# Patient Record
Sex: MALE | Race: BLACK OR AFRICAN AMERICAN | Employment: OTHER | URBAN - METROPOLITAN AREA
[De-identification: names, ages, dates, MRNs, and addresses within clinical notes are randomized per-mention and may not be internally consistent; named-entity substitution may affect disease eponyms.]

---

## 2022-09-20 ENCOUNTER — HOSPITAL ENCOUNTER (EMERGENCY)
Age: 57
Discharge: HOME | End: 2022-09-20
Attending: EMERGENCY MEDICINE
Payer: MEDICARE

## 2022-09-20 ENCOUNTER — APPOINTMENT (OUTPATIENT)
Dept: GENERAL RADIOLOGY | Age: 57
End: 2022-09-20
Payer: MEDICARE

## 2022-09-20 VITALS
DIASTOLIC BLOOD PRESSURE: 123 MMHG | HEIGHT: 72 IN | TEMPERATURE: 99.1 F | WEIGHT: 315 LBS | RESPIRATION RATE: 14 BRPM | BODY MASS INDEX: 42.66 KG/M2 | OXYGEN SATURATION: 94 % | SYSTOLIC BLOOD PRESSURE: 204 MMHG | HEART RATE: 83 BPM

## 2022-09-20 DIAGNOSIS — I10 HYPERTENSION, UNSPECIFIED TYPE: ICD-10-CM

## 2022-09-20 DIAGNOSIS — F32.A DEPRESSION, UNSPECIFIED DEPRESSION TYPE: Primary | ICD-10-CM

## 2022-09-20 LAB
ABSOLUTE EOS #: 0.23 K/UL (ref 0–0.44)
ABSOLUTE IMMATURE GRANULOCYTE: <0.03 K/UL (ref 0–0.3)
ABSOLUTE LYMPH #: 2.32 K/UL (ref 1.1–3.7)
ABSOLUTE MONO #: 0.59 K/UL (ref 0.1–1.2)
ACETAMINOPHEN LEVEL: <5 UG/ML (ref 10–30)
ALBUMIN SERPL-MCNC: 4.4 G/DL (ref 3.5–5.2)
ALBUMIN/GLOBULIN RATIO: 1.3 (ref 1–2.5)
ALP BLD-CCNC: 112 U/L (ref 40–129)
ALT SERPL-CCNC: 21 U/L (ref 5–41)
AMPHETAMINE SCREEN URINE: NEGATIVE
ANION GAP SERPL CALCULATED.3IONS-SCNC: 10 MMOL/L (ref 9–17)
AST SERPL-CCNC: 26 U/L
BARBITURATE SCREEN URINE: NEGATIVE
BASOPHILS # BLD: 1 % (ref 0–2)
BASOPHILS ABSOLUTE: 0.04 K/UL (ref 0–0.2)
BENZODIAZEPINE SCREEN, URINE: NEGATIVE
BILIRUB SERPL-MCNC: 0.6 MG/DL (ref 0.3–1.2)
BILIRUBIN URINE: ABNORMAL
BUN BLDV-MCNC: 8 MG/DL (ref 6–20)
CALCIUM SERPL-MCNC: 9.2 MG/DL (ref 8.6–10.4)
CANNABINOID SCREEN URINE: POSITIVE
CASTS UA: ABNORMAL /LPF (ref 0–8)
CHLORIDE BLD-SCNC: 103 MMOL/L (ref 98–107)
CO2: 26 MMOL/L (ref 20–31)
COCAINE METABOLITE, URINE: NEGATIVE
COLOR: ABNORMAL
CREAT SERPL-MCNC: 0.92 MG/DL (ref 0.7–1.2)
EOSINOPHILS RELATIVE PERCENT: 4 % (ref 1–4)
EPITHELIAL CELLS UA: ABNORMAL /HPF (ref 0–5)
ETHANOL PERCENT: <0.01 %
ETHANOL: <10 MG/DL
FENTANYL URINE: NEGATIVE
GFR AFRICAN AMERICAN: >60 ML/MIN
GFR NON-AFRICAN AMERICAN: >60 ML/MIN
GFR SERPL CREATININE-BSD FRML MDRD: ABNORMAL ML/MIN/{1.73_M2}
GLUCOSE BLD-MCNC: 87 MG/DL (ref 70–99)
GLUCOSE URINE: NEGATIVE
HCT VFR BLD CALC: 48.6 % (ref 40.7–50.3)
HEMOGLOBIN: 16.4 G/DL (ref 13–17)
IMMATURE GRANULOCYTES: 0 %
KETONES, URINE: ABNORMAL
LEUKOCYTE ESTERASE, URINE: ABNORMAL
LYMPHOCYTES # BLD: 36 % (ref 24–43)
MCH RBC QN AUTO: 30.3 PG (ref 25.2–33.5)
MCHC RBC AUTO-ENTMCNC: 33.7 G/DL (ref 28.4–34.8)
MCV RBC AUTO: 89.7 FL (ref 82.6–102.9)
METHADONE SCREEN, URINE: NEGATIVE
MONOCYTES # BLD: 9 % (ref 3–12)
NITRITE, URINE: NEGATIVE
NRBC AUTOMATED: 0 PER 100 WBC
OPIATES, URINE: NEGATIVE
OXYCODONE SCREEN URINE: NEGATIVE
PDW BLD-RTO: 13.9 % (ref 11.8–14.4)
PH UA: 5.5 (ref 5–8)
PHENCYCLIDINE, URINE: NEGATIVE
PLATELET # BLD: 166 K/UL (ref 138–453)
PMV BLD AUTO: 11.3 FL (ref 8.1–13.5)
POTASSIUM SERPL-SCNC: 3.6 MMOL/L (ref 3.7–5.3)
PROTEIN UA: ABNORMAL
RBC # BLD: 5.42 M/UL (ref 4.21–5.77)
RBC UA: ABNORMAL /HPF (ref 0–4)
SALICYLATE LEVEL: <1 MG/DL (ref 3–10)
SEG NEUTROPHILS: 50 % (ref 36–65)
SEGMENTED NEUTROPHILS ABSOLUTE COUNT: 3.24 K/UL (ref 1.5–8.1)
SODIUM BLD-SCNC: 139 MMOL/L (ref 135–144)
SPECIFIC GRAVITY UA: 1.03 (ref 1–1.03)
TEST INFORMATION: ABNORMAL
TOTAL PROTEIN: 7.8 G/DL (ref 6.4–8.3)
TOXIC TRICYCLIC SC,BLOOD: NEGATIVE
TROPONIN, HIGH SENSITIVITY: 22 NG/L (ref 0–22)
TROPONIN, HIGH SENSITIVITY: 25 NG/L (ref 0–22)
TURBIDITY: CLEAR
URINE HGB: NEGATIVE
UROBILINOGEN, URINE: NORMAL
WBC # BLD: 6.4 K/UL (ref 3.5–11.3)
WBC UA: ABNORMAL /HPF (ref 0–5)

## 2022-09-20 PROCEDURE — 80053 COMPREHEN METABOLIC PANEL: CPT

## 2022-09-20 PROCEDURE — 93005 ELECTROCARDIOGRAM TRACING: CPT

## 2022-09-20 PROCEDURE — 6370000000 HC RX 637 (ALT 250 FOR IP): Performed by: STUDENT IN AN ORGANIZED HEALTH CARE EDUCATION/TRAINING PROGRAM

## 2022-09-20 PROCEDURE — 80307 DRUG TEST PRSMV CHEM ANLYZR: CPT

## 2022-09-20 PROCEDURE — 85025 COMPLETE CBC W/AUTO DIFF WBC: CPT

## 2022-09-20 PROCEDURE — 84484 ASSAY OF TROPONIN QUANT: CPT

## 2022-09-20 PROCEDURE — 71045 X-RAY EXAM CHEST 1 VIEW: CPT

## 2022-09-20 PROCEDURE — 81001 URINALYSIS AUTO W/SCOPE: CPT

## 2022-09-20 PROCEDURE — 80143 DRUG ASSAY ACETAMINOPHEN: CPT

## 2022-09-20 PROCEDURE — G0480 DRUG TEST DEF 1-7 CLASSES: HCPCS

## 2022-09-20 PROCEDURE — 80179 DRUG ASSAY SALICYLATE: CPT

## 2022-09-20 PROCEDURE — 99285 EMERGENCY DEPT VISIT HI MDM: CPT

## 2022-09-20 RX ORDER — AMLODIPINE BESYLATE 10 MG/1
10 TABLET ORAL ONCE
Status: COMPLETED | OUTPATIENT
Start: 2022-09-20 | End: 2022-09-20

## 2022-09-20 RX ORDER — LISINOPRIL 10 MG/1
40 TABLET ORAL ONCE
Status: COMPLETED | OUTPATIENT
Start: 2022-09-20 | End: 2022-09-20

## 2022-09-20 RX ORDER — METOPROLOL SUCCINATE 50 MG/1
50 TABLET, EXTENDED RELEASE ORAL ONCE
Status: COMPLETED | OUTPATIENT
Start: 2022-09-20 | End: 2022-09-20

## 2022-09-20 RX ORDER — HYDROCHLOROTHIAZIDE 25 MG/1
25 TABLET ORAL ONCE
Status: COMPLETED | OUTPATIENT
Start: 2022-09-20 | End: 2022-09-20

## 2022-09-20 RX ADMIN — LISINOPRIL 40 MG: 10 TABLET ORAL at 15:25

## 2022-09-20 RX ADMIN — HYDROCHLOROTHIAZIDE 25 MG: 25 TABLET ORAL at 17:46

## 2022-09-20 RX ADMIN — METOPROLOL SUCCINATE 50 MG: 50 TABLET, FILM COATED, EXTENDED RELEASE ORAL at 17:46

## 2022-09-20 RX ADMIN — AMLODIPINE BESYLATE 10 MG: 10 TABLET ORAL at 15:25

## 2022-09-20 ASSESSMENT — PAIN SCALES - GENERAL: PAINLEVEL_OUTOF10: 10

## 2022-09-20 ASSESSMENT — PAIN DESCRIPTION - LOCATION: LOCATION: HEAD

## 2022-09-20 ASSESSMENT — PAIN DESCRIPTION - FREQUENCY: FREQUENCY: CONTINUOUS

## 2022-09-20 ASSESSMENT — PAIN - FUNCTIONAL ASSESSMENT: PAIN_FUNCTIONAL_ASSESSMENT: 0-10

## 2022-09-20 ASSESSMENT — PAIN DESCRIPTION - DESCRIPTORS: DESCRIPTORS: PATIENT UNABLE TO DESCRIBE

## 2022-09-20 NOTE — ED TRIAGE NOTES
Patient presented to the ED today with complaints of hypertension, and diaphoresis. Patient stated that symptoms started awhile ago. Patient denies chest pain.

## 2022-09-20 NOTE — ED PROVIDER NOTES
[facility] ED     Emergency Department     Faculty Attestation    I performed a history and physical examination of the patient and discussed management with the resident. I reviewed the residents note and agree with the documented findings and plan of care. Any areas of disagreement are noted on the chart. I was personally present for the key portions of any procedures. I have documented in the chart those procedures where I was not present during the key portions. I have reviewed the emergency nurses triage note. I agree with the chief complaint, past medical history, past surgical history, allergies, medications, social and family history as documented unless otherwise noted below. For Physician Assistant/ Nurse Practitioner cases/documentation I have personally evaluated this patient and have completed at least one if not all key elements of the E/M (history, physical exam, and MDM). Additional findings are as noted. Patient referred from PCPs office for elevated blood pressure. Patient states he would not of come on his own. However as I enter the room patient was sobbing tearful. After some consoling he did disclose that he is feeling suicidal due to significant life stressors with his kids. Does have a history depression not on any medications. Have a plan at this time. Denies headache no chest pain no trouble breathing. Obese but abdomen soft nontender. No respiratory distress speaking in full sentences normal pupils. We will proceed with medical work-up, clear if able to psych. EKG interpretation: Sinus rhythm 93. Normal intervals. Normal axis. There are several PVCs but nonconsecutive. No acute ST or T changes.       Critical Care     none    Cliff Crow MD, Alison Wall  Attending Emergency  Physician           Cliff Crow MD  09/20/22 0054

## 2022-09-20 NOTE — DISCHARGE INSTRUCTIONS
For your symptoms, we check several labs including your blood sugar, blood counts, electrolytes, kidney function. These were all normal.  The labs to look at your heart including your troponin and EKG were also normal meaning no signs of a heart attack today. Your chest x-ray showed no signs of pneumonia. Your blood pressure was very high in the emergency department. We gave you all of your home medications. Please continue to take your home medications as prescribed. You need to follow-up with your cardiologist and your primary care physician to discuss if there needs to be any changes to these medications given that your blood pressure was so high. It is a good sign that you are not having symptoms from this, however high blood pressure can lead to heart attacks, kidney failure and strokes. Please follow all instructions given to you by our . Please follow-up with one of the outpatient providers she has given to you as soon as possible. Please try to follow-up with them within the next week. If you develop any worsening of your symptoms, especially if you are feeling suicidal or like you want to harm yourself or others, please return to the emergency department immediately. We are here to help you and care about you.

## 2022-09-20 NOTE — ED NOTES
Patient is 61 y/o/m presenting to ED for suicidal ideation. When patient arrived in ED he was sobbing due to stress at home and was suicidal. Patient's sister was present and in the room as support for him and assisted in calming him down. When JASMINEW met with patient, he was no longer upset or crying. Patient stated he has a lot of stress in life concerning his 3 children. He stated there are moments where everything is going well and others where communication is limited and choices may not be the ones he would choose for them. Patient stated he has a girlfriend at home who works often and he is not always able to talk with her about things. Patient stated he has never spoken to a therapist or had an official [place] [id number] diagnosis. Patient denied SI/HI. Patient denied hallucinations or delusions. Patient stated his appetite is really good. He stated his sleep is \"so-so. \" Patient admitted to daily THC use. He stated he drinks alcohol occasionally. Patient stated he is open to talking with a therapist and will be calling to schedule a new patient appointment. Mental health list provided. Physician notified.       CARTER Wilkinson  09/20/22 0885

## 2022-09-20 NOTE — ED NOTES
[] Nico    [] [facility]    [x]  [facility] ASSESSMENT      Y  N     [x] [] In the past two weeks have you had thoughts of hurting yourself in any way? [x] [] In the past two weeks have you had thoughts that you would be better off dead? [] [x] Have you made a suicide attempt in the past two months? [] [x] Do you have a plan for hurting yourself or suicide? [x] [] Presence of hallucinations/voices related to hurting himself or herself or someone else. SUICIDE/SECURITY WATCH PRECAUTION CHECKLIST     Orders    [x]  Suicide/Security Watch Precautions initiated as checked below:   9/20/22 3:31 PM EDT 14/14    [x] Notified physician:  Liliana Winter MD  9/20/22 3:31 PM EDT    [x] Orders obtained as appropriate:     [x] 1:1 Observer     [] Psych Consult     [] Psych Consult    Name:  Date:  Time:    [x] 1:1 Observer, Notified by:  Casi Fry RN    Contact Nurse Supervisor    [x] Remove all personal clothes from room and place in snap/paper gown/pants. Slipper only    [x] Remove all personal belongings from room and secured away from patient. Documentation    [x] Initiate Suicide/Security Watch Precaution Flow Sheet    [x] Initiate individualized Care Plan/Problem    [x] Document why precautions initiated on flow sheet (Initiate Nursing Care Plan/Problem)    [x] 1:1 Observer in place; instructions provided. Suicide precautions require observer be within arms length. [x] Nurse-Observer Communication Hand-off initiated by RN, reviewed with Observer. Subsequently used as Hand Off between Observers. [x] Initiate every 15 minute observations per observer as delegated by the RN.     [x] Initiate RN assessment and documentation    Environmental Scan  Search Criteria and Process: OPTIONAL, see Search Policy    [x] Reason for search: suicidal     [x] Nursing in presence of second person to search patient    [x] Patient notified of reason for body assessment and belongings search:     Persons present during search:   Results of search and disposition:       Searchers Name: Security      These items or items similar should be removed from the room:   [] Chairs   [] Telephone   [] Trash cans and liners   [x] Plastic utensils (order Patient Safety tray)   [x] Empty or remove Sharps containers   [x] All personal clothing/belongings removed   [x] All unnecessary lead wires, electrical cords, draw cords, etc.   [x] Flowers and plants   [x] Double check for lighters, matches, razors, any glass items etc that can be used as weapons. Person completing Checklist: Jim Varghese RN       GENERAL INFORMATION     Y  N     [x] [] Has the patient been informed that they are on a watch and what that means? [x] [] Can the patient get out of Bed without nursing assistance? [x] [] Can the patient use the restroom without nursing assistance? [] [x] Can the patient walk the halls to [place] their legs? \"   [] [x] Does the patient have metal utensils? [x] [] Have the patient's belongings been placed out of control of the patient? [x] [] Have the patient and his/her belongings been checked for contraband? [x] [] Is the patient under any visitor restrictions? If Yes, explain:   [] [x] Is the patient under an alias? [place] [id number] Name:   Authorized visitors (no more than two are to be on the list)   Name/Relationship:   Name/Relationship:    Name of Staff member that you  Received this information from?: security     General Description:    Luisa Randolph 14/14 male 62 y.o. Admission weight: (!) 346 lb (156.9 kg) Height: 6' (182.9 cm)  Race: []  [x] Black  []   []   [] [place] [] Other  Facial Hair:  [] Yes  [] No  If yes, please describe: Identifying Marks (i.e. Visible tattoos, scars, etc... ):     NURSING CARE PLAN    Nursing Diagnosis: Risk of Self Directed Harm  [] Actual  [x] Potential  Date Started: 9/20/22      Etiological Factors: (related to)  [x] Expressed or implied suicidal ideation/behavior  [x] Depression  [] Suicide attempt      [] Low self-esteem  [] Hallucinations      [x] Feeling of Hopelessness  [] Substance abuse or withdrawal    [] Dysfunctional family  [] Major traumatic event, eg., divorce, etc   [x] Excessive stress/anxiety    9/20/22    Expected Outcomes    Patient will:   [x] Patient will remain safe for the duration of their stay   [x] Patient's environment will be safe, eg. Free of potential suicide weapons   [] Verbalize Recovery from suicidal episode and improvement in self-worth   [x] Discuss feeling that precipitated suicide attempt/thoughts/behavior   [] Will describe available resources for crisis prevention and management   [] Will verbalize positive coping skills     Nursing Intervention   [x] Assessment and Observations hourly   [x] Suicide Precautions implemented with patient, should be 1:1 observation   [x] Document observation [id number] and RN assessment hourly   [] Consult physician for:    [] Psychiatric consult    [] Pharmacological therapy    [] Other:    [x] Patient search completed by security   [x] Initiated appropriate safety protocols by removing from the patient's environment anything that could be used to inflict self injury, eg. Order safe tray, snap gown, etc   [x] Maintain open, warm, caring, non-judgmental attitude/manner towards patient   [] Discuss advantages and disadvantages of existing coping methods/skills   [x] Assist and educate patient with identifying present strengths and coping skills   [x] Keep patient informed regarding plan of care and provide clear concise explanations. Provide the patient/family education information as well as telephone numbers and other information about crisis centers, hot lines, and counselors.     Discharge Planning:   [] Referral  [] Groups [] Health agencies  [] Other:            Jennifer Mace RN  09/20/22 5398

## 2022-09-20 NOTE — ED NOTES
Writer triaged patient and asked SSI questions. Patient stated that he has thoughts of killing himself but no plan to act on them. Writer informed .       Fozia Valera LPN  84/66/69 1118

## 2022-09-20 NOTE — ED NOTES
Pt to room 14 via wheelchair with c/o suicidal thoughts. Pt is tearful upon triage and states that he is having thoughts of harming himself and others. Pt denies plan. To writer, pt denies chest pain, denies SOB, denies N/V/D. Pt placed on continuous cardiac monitor, bp and pulse ox. EKG completed, IV established, blood work drawn. Will continue plan of care.       Riana Hernandez RN  09/20/22 7525

## 2022-09-21 ASSESSMENT — ENCOUNTER SYMPTOMS
SHORTNESS OF BREATH: 1
DIARRHEA: 0
NAUSEA: 0
RHINORRHEA: 0
ABDOMINAL PAIN: 0
BACK PAIN: 0
VOMITING: 0
COUGH: 0

## 2022-09-21 NOTE — ED PROVIDER NOTES
Faculty Sign-Out Attestation  Handoff taken on the following patient from prior Attending Physician: Coral Thomas    I was available and discussed any additional care issues that arose and coordinated the management plans with the resident(s) caring for the patient during my duty period. Any areas of disagreement with residents documentation of care or procedures are noted on the chart. I was personally present for the key portions of any/all procedures during my duty period. I have documented in the chart those procedures where I was not present during the key portions. 59-year-old male sent by PCP for hypertension, reports that he is passively suicidal due to problems with his children. Getting medications for hypertension, asymptomatic at this time. Anticipate need for mental health evaluation. 1735: Patient reassessed, states that he is feeling much improved from a mental health standpoint. Has forward thinking behavior (seeing his grandchildren grow up, planning to try to reconcile with his daughter using his sister as a go between), denies any active suicidal ideation at this time. Social work evaluated patient, does not believe that he meets criteria for inpatient psychiatric evaluation. At this point, I do feel that he is safe for outpatient evaluation. Patient given resources, we discussed returning if he started having any active suicidal thoughts, was unable to get into an outpatient psychiatric evaluation, or if there were any changes in how he was feeling. Patient agreeable to this plan of care, discharged home.     Freeman Tran MD  Attending Physician        Freeman Tran MD  09/21/22 9828

## 2022-09-26 LAB
EKG ATRIAL RATE: 93 BPM
EKG P AXIS: 36 DEGREES
EKG P-R INTERVAL: 172 MS
EKG Q-T INTERVAL: 380 MS
EKG QRS DURATION: 100 MS
EKG QTC CALCULATION (BAZETT): 472 MS
EKG R AXIS: -2 DEGREES
EKG T AXIS: 15 DEGREES
EKG VENTRICULAR RATE: 93 BPM

## 2022-11-21 ENCOUNTER — HOSPITAL ENCOUNTER (EMERGENCY)
Age: 57
Discharge: NURSING, REHAB OR LONG-TERM CARE | End: 2022-11-21
Attending: EMERGENCY MEDICINE
Payer: MEDICARE

## 2022-11-21 VITALS
RESPIRATION RATE: 16 BRPM | SYSTOLIC BLOOD PRESSURE: 114 MMHG | HEIGHT: 74 IN | DIASTOLIC BLOOD PRESSURE: 58 MMHG | WEIGHT: 315 LBS | BODY MASS INDEX: 40.43 KG/M2 | OXYGEN SATURATION: 100 % | TEMPERATURE: 98 F | HEART RATE: 90 BPM

## 2022-11-21 DIAGNOSIS — Z13.30 ENCOUNTER FOR SCREENING EXAMINATION FOR MENTAL HEALTH AND BEHAVIORAL DISORDERS: Primary | ICD-10-CM

## 2022-11-21 PROCEDURE — 99283 EMERGENCY DEPT VISIT LOW MDM: CPT

## 2022-11-21 ASSESSMENT — ENCOUNTER SYMPTOMS
ABDOMINAL PAIN: 0
SHORTNESS OF BREATH: 0
COUGH: 0

## 2022-11-21 ASSESSMENT — PAIN - FUNCTIONAL ASSESSMENT: PAIN_FUNCTIONAL_ASSESSMENT: NONE - DENIES PAIN

## 2022-11-21 NOTE — ED NOTES
Jeffrey Barron from nursing facility arranged transportation for patient to go back to [facility] and then moved to a different facility.      Jeffrey Barron stated [facility] Cover will be here at 4:00pm.
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
## 2022-11-21 NOTE — ED NOTES
contacted [facility] and spoke to Elias Bradley. Elias Bradley informed writer that patient is not allowed back to the facility but is working on a discharge order to send to a new facility.
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
## 2022-12-01 ENCOUNTER — APPOINTMENT (OUTPATIENT)
Dept: MRI IMAGING | Age: 57
End: 2022-12-01
Payer: MEDICARE

## 2022-12-01 ENCOUNTER — HOSPITAL ENCOUNTER (INPATIENT)
Age: 57
LOS: 2 days | Discharge: HOME | End: 2022-12-03
Attending: EMERGENCY MEDICINE | Admitting: PSYCHIATRY & NEUROLOGY
Payer: MEDICARE

## 2022-12-01 ENCOUNTER — APPOINTMENT (OUTPATIENT)
Dept: CT IMAGING | Age: 57
End: 2022-12-01
Payer: MEDICARE

## 2022-12-01 DIAGNOSIS — I63.9 CEREBROVASCULAR ACCIDENT (CVA), UNSPECIFIED MECHANISM (HCC): Primary | ICD-10-CM

## 2022-12-01 PROBLEM — I69.322 DYSARTHRIA DUE TO RECENT STROKE: Status: ACTIVE | Noted: 2022-12-01

## 2022-12-01 PROBLEM — I16.1 HYPERTENSIVE EMERGENCY: Status: ACTIVE | Noted: 2022-12-01

## 2022-12-01 PROBLEM — I16.0 HYPERTENSIVE URGENCY: Status: ACTIVE | Noted: 2022-12-01

## 2022-12-01 LAB
ABSOLUTE EOS #: 0.17 K/UL (ref 0–0.44)
ABSOLUTE IMMATURE GRANULOCYTE: <0.03 K/UL (ref 0–0.3)
ABSOLUTE LYMPH #: 2.25 K/UL (ref 1.1–3.7)
ABSOLUTE MONO #: 0.64 K/UL (ref 0.1–1.2)
ANION GAP SERPL CALCULATED.3IONS-SCNC: 13 MMOL/L (ref 9–17)
ANION GAP: 14 MMOL/L (ref 7–16)
BASOPHILS # BLD: 1 % (ref 0–2)
BASOPHILS ABSOLUTE: 0.05 K/UL (ref 0–0.2)
BUN BLDV-MCNC: 19 MG/DL (ref 6–20)
CALCIUM SERPL-MCNC: 9.4 MG/DL (ref 8.6–10.4)
CHLORIDE BLD-SCNC: 104 MMOL/L (ref 98–107)
CO2: 21 MMOL/L (ref 20–31)
CREAT SERPL-MCNC: 0.89 MG/DL (ref 0.7–1.2)
EGFR, POC: >60 ML/MIN/1.73M2
EOSINOPHILS RELATIVE PERCENT: 2 % (ref 1–4)
GFR SERPL CREATININE-BSD FRML MDRD: >60 ML/MIN/1.73M2
GLUCOSE BLD-MCNC: 93 MG/DL (ref 70–99)
GLUCOSE BLD-MCNC: 94 MG/DL (ref 74–100)
HCO3 VENOUS: 24.7 MMOL/L (ref 22–29)
HCT VFR BLD CALC: 46 % (ref 40.7–50.3)
HEMOGLOBIN: 15.6 G/DL (ref 13–17)
IMMATURE GRANULOCYTES: 0 %
INR BLD: 1
LYMPHOCYTES # BLD: 32 % (ref 24–43)
MCH RBC QN AUTO: 30.4 PG (ref 25.2–33.5)
MCHC RBC AUTO-ENTMCNC: 33.9 G/DL (ref 28.4–34.8)
MCV RBC AUTO: 89.7 FL (ref 82.6–102.9)
MONOCYTES # BLD: 9 % (ref 3–12)
MYOGLOBIN: 51 NG/ML (ref 28–72)
NRBC AUTOMATED: 0 PER 100 WBC
O2 SAT, VEN: 85 % (ref 60–85)
PARTIAL THROMBOPLASTIN TIME: 22.2 SEC (ref 20.5–30.5)
PCO2, VEN: 34.6 MM HG (ref 41–51)
PDW BLD-RTO: 13.1 % (ref 11.8–14.4)
PH VENOUS: 7.46 (ref 7.32–7.43)
PLATELET # BLD: 184 K/UL (ref 138–453)
PMV BLD AUTO: 11.9 FL (ref 8.1–13.5)
PO2, VEN: 47 MM HG (ref 30–50)
POC BUN: 20 MG/DL (ref 8–26)
POC CHLORIDE: 107 MMOL/L (ref 98–107)
POC CREATININE: 1.07 MG/DL (ref 0.51–1.19)
POC HEMATOCRIT: 47 % (ref 41–53)
POC HEMOGLOBIN: 16.2 G/DL (ref 13.5–17.5)
POC IONIZED CALCIUM: 1.2 MMOL/L (ref 1.15–1.33)
POC LACTIC ACID: 1.54 MMOL/L (ref 0.56–1.39)
POC POTASSIUM: 3.7 MMOL/L (ref 3.5–4.5)
POC SODIUM: 144 MMOL/L (ref 138–146)
POC TCO2: 24 MMOL/L (ref 22–30)
POSITIVE BASE EXCESS, VEN: 1 (ref 0–3)
POTASSIUM SERPL-SCNC: 4 MMOL/L (ref 3.7–5.3)
PROTHROMBIN TIME: 10.4 SEC (ref 9.1–12.3)
RBC # BLD: 5.13 M/UL (ref 4.21–5.77)
SEG NEUTROPHILS: 56 % (ref 36–65)
SEGMENTED NEUTROPHILS ABSOLUTE COUNT: 3.99 K/UL (ref 1.5–8.1)
SODIUM BLD-SCNC: 138 MMOL/L (ref 135–144)
TOTAL CK: 165 U/L (ref 39–308)
TROPONIN, HIGH SENSITIVITY: 16 NG/L (ref 0–22)
WBC # BLD: 7.1 K/UL (ref 3.5–11.3)

## 2022-12-01 PROCEDURE — 85014 HEMATOCRIT: CPT

## 2022-12-01 PROCEDURE — 6370000000 HC RX 637 (ALT 250 FOR IP)

## 2022-12-01 PROCEDURE — 82550 ASSAY OF CK (CPK): CPT

## 2022-12-01 PROCEDURE — 85730 THROMBOPLASTIN TIME PARTIAL: CPT

## 2022-12-01 PROCEDURE — 82553 CREATINE MB FRACTION: CPT

## 2022-12-01 PROCEDURE — 2060000000 HC ICU INTERMEDIATE R&B

## 2022-12-01 PROCEDURE — 99222 1ST HOSP IP/OBS MODERATE 55: CPT | Performed by: INTERNAL MEDICINE

## 2022-12-01 PROCEDURE — 82330 ASSAY OF CALCIUM: CPT

## 2022-12-01 PROCEDURE — 84484 ASSAY OF TROPONIN QUANT: CPT

## 2022-12-01 PROCEDURE — 80048 BASIC METABOLIC PNL TOTAL CA: CPT

## 2022-12-01 PROCEDURE — 82947 ASSAY GLUCOSE BLOOD QUANT: CPT

## 2022-12-01 PROCEDURE — 99223 1ST HOSP IP/OBS HIGH 75: CPT | Performed by: PSYCHIATRY & NEUROLOGY

## 2022-12-01 PROCEDURE — 70450 CT HEAD/BRAIN W/O DYE: CPT

## 2022-12-01 PROCEDURE — 82565 ASSAY OF CREATININE: CPT

## 2022-12-01 PROCEDURE — 2500000003 HC RX 250 WO HCPCS

## 2022-12-01 PROCEDURE — 6360000002 HC RX W HCPCS

## 2022-12-01 PROCEDURE — 2580000003 HC RX 258

## 2022-12-01 PROCEDURE — 83605 ASSAY OF LACTIC ACID: CPT

## 2022-12-01 PROCEDURE — 85025 COMPLETE CBC W/AUTO DIFF WBC: CPT

## 2022-12-01 PROCEDURE — 70498 CT ANGIOGRAPHY NECK: CPT

## 2022-12-01 PROCEDURE — 70551 MRI BRAIN STEM W/O DYE: CPT

## 2022-12-01 PROCEDURE — 80051 ELECTROLYTE PANEL: CPT

## 2022-12-01 PROCEDURE — 6360000004 HC RX CONTRAST MEDICATION: Performed by: STUDENT IN AN ORGANIZED HEALTH CARE EDUCATION/TRAINING PROGRAM

## 2022-12-01 PROCEDURE — 93005 ELECTROCARDIOGRAM TRACING: CPT | Performed by: STUDENT IN AN ORGANIZED HEALTH CARE EDUCATION/TRAINING PROGRAM

## 2022-12-01 PROCEDURE — 83874 ASSAY OF MYOGLOBIN: CPT

## 2022-12-01 PROCEDURE — 85610 PROTHROMBIN TIME: CPT

## 2022-12-01 PROCEDURE — 84520 ASSAY OF UREA NITROGEN: CPT

## 2022-12-01 PROCEDURE — 99285 EMERGENCY DEPT VISIT HI MDM: CPT

## 2022-12-01 PROCEDURE — 82803 BLOOD GASES ANY COMBINATION: CPT

## 2022-12-01 RX ORDER — ONDANSETRON 2 MG/ML
4 INJECTION INTRAMUSCULAR; INTRAVENOUS EVERY 6 HOURS PRN
Status: DISCONTINUED | OUTPATIENT
Start: 2022-12-01 | End: 2022-12-03 | Stop reason: HOSPADM

## 2022-12-01 RX ORDER — ATORVASTATIN CALCIUM 40 MG/1
40 TABLET, FILM COATED ORAL NIGHTLY
Status: DISCONTINUED | OUTPATIENT
Start: 2022-12-01 | End: 2022-12-03 | Stop reason: HOSPADM

## 2022-12-01 RX ORDER — ASPIRIN 81 MG/1
81 TABLET ORAL DAILY
Status: DISCONTINUED | OUTPATIENT
Start: 2022-12-02 | End: 2022-12-03 | Stop reason: HOSPADM

## 2022-12-01 RX ORDER — HYDRALAZINE HYDROCHLORIDE 20 MG/ML
10 INJECTION INTRAMUSCULAR; INTRAVENOUS ONCE
Status: COMPLETED | OUTPATIENT
Start: 2022-12-01 | End: 2022-12-01

## 2022-12-01 RX ORDER — POLYETHYLENE GLYCOL 3350 17 G/17G
17 POWDER, FOR SOLUTION ORAL DAILY PRN
Status: DISCONTINUED | OUTPATIENT
Start: 2022-12-01 | End: 2022-12-03 | Stop reason: HOSPADM

## 2022-12-01 RX ORDER — ASPIRIN 300 MG/1
300 SUPPOSITORY RECTAL DAILY
Status: DISCONTINUED | OUTPATIENT
Start: 2022-12-02 | End: 2022-12-03 | Stop reason: HOSPADM

## 2022-12-01 RX ORDER — LISINOPRIL 20 MG/1
40 TABLET ORAL DAILY
Status: DISCONTINUED | OUTPATIENT
Start: 2022-12-02 | End: 2022-12-03 | Stop reason: HOSPADM

## 2022-12-01 RX ORDER — LABETALOL HYDROCHLORIDE 5 MG/ML
10 INJECTION, SOLUTION INTRAVENOUS EVERY 10 MIN PRN
Status: DISCONTINUED | OUTPATIENT
Start: 2022-12-01 | End: 2022-12-03 | Stop reason: HOSPADM

## 2022-12-01 RX ORDER — ONDANSETRON 4 MG/1
4 TABLET, ORALLY DISINTEGRATING ORAL EVERY 8 HOURS PRN
Status: DISCONTINUED | OUTPATIENT
Start: 2022-12-01 | End: 2022-12-03 | Stop reason: HOSPADM

## 2022-12-01 RX ORDER — ENOXAPARIN SODIUM 100 MG/ML
30 INJECTION SUBCUTANEOUS 2 TIMES DAILY
Status: DISCONTINUED | OUTPATIENT
Start: 2022-12-01 | End: 2022-12-03 | Stop reason: HOSPADM

## 2022-12-01 RX ORDER — AMLODIPINE BESYLATE 10 MG/1
10 TABLET ORAL DAILY
Status: DISCONTINUED | OUTPATIENT
Start: 2022-12-02 | End: 2022-12-03 | Stop reason: HOSPADM

## 2022-12-01 RX ORDER — CLOPIDOGREL BISULFATE 75 MG/1
75 TABLET ORAL DAILY
Status: DISCONTINUED | OUTPATIENT
Start: 2022-12-01 | End: 2022-12-03 | Stop reason: HOSPADM

## 2022-12-01 RX ORDER — SODIUM CHLORIDE 9 MG/ML
INJECTION, SOLUTION INTRAVENOUS CONTINUOUS
Status: DISCONTINUED | OUTPATIENT
Start: 2022-12-01 | End: 2022-12-02

## 2022-12-01 RX ORDER — HYDROCHLOROTHIAZIDE 25 MG/1
25 TABLET ORAL DAILY
Status: DISCONTINUED | OUTPATIENT
Start: 2022-12-02 | End: 2022-12-03 | Stop reason: HOSPADM

## 2022-12-01 RX ADMIN — Medication 10 MG: at 21:29

## 2022-12-01 RX ADMIN — SODIUM CHLORIDE: 9 INJECTION, SOLUTION INTRAVENOUS at 21:31

## 2022-12-01 RX ADMIN — ENOXAPARIN SODIUM 30 MG: 100 INJECTION SUBCUTANEOUS at 21:49

## 2022-12-01 RX ADMIN — DESMOPRESSIN ACETATE 40 MG: 0.2 TABLET ORAL at 21:49

## 2022-12-01 RX ADMIN — CLOPIDOGREL 75 MG: 75 TABLET, FILM COATED ORAL at 18:43

## 2022-12-01 RX ADMIN — IOPAMIDOL 90 ML: 755 INJECTION, SOLUTION INTRAVENOUS at 11:49

## 2022-12-01 RX ADMIN — HYDRALAZINE HYDROCHLORIDE 10 MG: 20 INJECTION INTRAMUSCULAR; INTRAVENOUS at 18:42

## 2022-12-01 ASSESSMENT — ENCOUNTER SYMPTOMS
FACIAL SWELLING: 0
COUGH: 0
SORE THROAT: 0
CHEST TIGHTNESS: 0
SINUS PRESSURE: 0
NAUSEA: 0
TROUBLE SWALLOWING: 0
COLOR CHANGE: 0
VOMITING: 0
SINUS PAIN: 0
WHEEZING: 0
CONSTIPATION: 0
DIARRHEA: 0
ABDOMINAL PAIN: 0
SHORTNESS OF BREATH: 0

## 2022-12-01 ASSESSMENT — PAIN - FUNCTIONAL ASSESSMENT: PAIN_FUNCTIONAL_ASSESSMENT: NONE - DENIES PAIN

## 2022-12-01 NOTE — ED PROVIDER NOTES
[facility] ED     Emergency Department     Faculty Attestation    I performed a history and physical examination of the patient and discussed management with the resident. I reviewed the residents note and agree with the documented findings and plan of care. Any areas of disagreement are noted on the chart. I was personally present for the key portions of any procedures. I have documented in the chart those procedures where I was not present during the key portions. I have reviewed the emergency nurses triage note. I agree with the chief complaint, past medical history, past surgical history, allergies, medications, social and family history as documented unless otherwise noted below. For Physician Assistant/ Nurse Practitioner cases/documentation I have personally evaluated this patient and have completed at least one if not all key elements of the E/M (history, physical exam, and MDM). Additional findings are as noted. Called to bedside patient here with possible stroke. Per EMS report patient had a stroke last month was at a follow-up appointment today was confused altered trouble speaking the 911 was called. Patient unable to provide any meaningful history at this time family is in route per EMS. On exam does have slurred speech facial asymmetry but moving all extremities. Will call stroke alert given possible worsening symptoms while reviewing old records for baseline and awaiting family arrival    EKG interpretation: Sinus rhythm 90. Normal intervals normal axis no acute ST or T changes no acute findings    Critical Care     CRITICAL CARE: There was a high probability of clinically significant/life threatening deterioration in this patient's condition which required my urgent intervention. Total critical care time was 15 minutes. This excludes any time for separately reportable procedures.        Ruben Wallace MD, Aiden Archer  Attending Emergency  Physician           Catrina Thomas Khalif Arnold MD  12/01/22 6021

## 2022-12-01 NOTE — ED PROVIDER NOTES
[facility] ED  Emergency Department Encounter  Emergency Medicine Resident     Pt Name: Abelardo Colon  MRN: [id number]  [place] 1965  Date of evaluation: 12/1/22  PCP:  No primary care provider on file. CHIEF COMPLAINT       Chief Complaint   Patient presents with    Altered Mental Status    Aphasia              HISTORY OFPRESENT ILLNESS  (Location/Symptom, Timing/Onset, Context/Setting, Quality, Duration, Modifying Factors,Severity.)      Abelardo Colon is a 62 y. o.yo male who presents with EMS due to concerns of aphasia altered mental status. Patient was at a follow-up facility when he was noted to have significant aphasia. Patient had recent stroke, has been staying in a rehab facility since then. Patient denies any concerns or complaints at this time. Patient denies any illness, fever chills nausea or vomiting. PAST MEDICAL / SURGICAL / SOCIAL / FAMILY HISTORY      has a past medical history of HTN (hypertension) and Obesity. has a past surgical history that includes Nerve Block (08/05/2013); Nerve Block (9/10/13); and Nerve Block (3/28/14).  ]    Social History     Socioeconomic History    Marital status: Legally      Spouse name: Not on file    Number of children: Not on file    Years of education: Not on file    Highest education level: Not on file   Occupational History    Not on file   Tobacco Use    Smoking status: Not on file    Smokeless tobacco: Not on file   Substance and Sexual Activity    Alcohol use: Not on file    Drug use: Not on file    Sexual activity: Not on file   Other Topics Concern    Not on file   Social History Narrative    Not on file     Social Determinants of Health     Financial Resource Strain: Not on file   Food Insecurity: Not on file   Transportation Needs: Not on file   Physical Activity: Not on file   Stress: Not on file   Social Connections: Not on file   Intimate Partner Violence: Not on file   Housing Stability: Not on file History reviewed. No pertinent family history. Allergies:  Patient has no known allergies. Home Medications:  Prior to Admission medications    Medication Sig Start Date End Date Taking? Authorizing Provider   acetaminophen-codeine (TYLENOL/CODEINE #3) 300-30 MG per tablet Take 1 tablet by mouth every 8 hours as needed for Pain. Historical Provider, MD   amLODIPine (NORVASC) 10 MG tablet Take 10 mg by mouth daily. Historical Provider, MD   hydrochlorothiazide (HYDRODIURIL) 25 MG tablet Take 25 mg by mouth daily. Historical Provider, MD   lisinopril (PRINIVIL;ZESTRIL) 40 MG tablet Take 40 mg by mouth daily. Historical Provider, MD   nebivolol (BYSTOLIC) 5 MG tablet Take 5 mg by mouth daily. Historical Provider, MD   traMADol (ULTRAM) 50 MG tablet Take 50 mg by mouth every 8 hours as needed. Historical Provider, MD       REVIEW OFSYSTEMS    (2-9 systems for level 4, 10 or more for level 5)      Review of Systems   Constitutional:  Negative for chills, diaphoresis, fatigue and fever. HENT:  Negative for facial swelling, nosebleeds, sinus pressure, sinus pain, sore throat and trouble swallowing. Respiratory:  Negative for cough, chest tightness, shortness of breath and wheezing. Cardiovascular:  Negative for chest pain, palpitations and leg swelling. Gastrointestinal:  Negative for abdominal pain, constipation, diarrhea, nausea and vomiting. Endocrine: Negative for polydipsia, polyphagia and polyuria. Genitourinary:  Negative for dysuria, flank pain and hematuria. Musculoskeletal:  Negative for arthralgias, gait problem, neck pain and neck stiffness. Skin:  Negative for color change, rash and wound. Neurological:  Positive for speech difficulty. Negative for dizziness, syncope, weakness, light-headedness and headaches.      PHYSICAL EXAM   (up to 7 for level 4, 8 or more forlevel 5)      INITIAL VITALS:   ED Triage Vitals   BP Temp Temp Source Heart Rate Resp SpO2 Height Weight   12/01/22 1130 12/01/22 1129 12/01/22 1129 12/01/22 1129 12/01/22 1129 12/01/22 1129 12/01/22 1133 12/01/22 1133   (!) 172/141 99.1 °F (37.3 °C) Oral 88 13 99 % 6' (1.829 m) 300 lb (136.1 kg)       Physical Exam  Constitutional:       General: He is not in acute distress. Appearance: He is obese. He is not ill-appearing. HENT:      Head: Normocephalic and atraumatic. Right Ear: External ear normal.      Left Ear: External ear normal.      Nose: Nose normal.      Mouth/Throat:      Mouth: Mucous membranes are moist.      Pharynx: Oropharynx is clear. No posterior oropharyngeal erythema. Eyes:      General: No visual field deficit or scleral icterus. Extraocular Movements: Extraocular movements intact. Conjunctiva/sclera: Conjunctivae normal.      Pupils: Pupils are equal, round, and reactive to light. Cardiovascular:      Rate and Rhythm: Normal rate and regular rhythm. Pulses: Normal pulses. Heart sounds: Normal heart sounds. Pulmonary:      Effort: Pulmonary effort is normal. No respiratory distress. Breath sounds: Normal breath sounds. Abdominal:      General: Abdomen is flat. Bowel sounds are normal. There is no distension. Palpations: Abdomen is soft. Tenderness: There is no abdominal tenderness. Musculoskeletal:         General: Normal range of motion. Cervical back: Normal range of motion. No muscular tenderness. Skin:     General: Skin is warm and dry. Neurological:      General: No focal deficit present. Mental Status: He is alert. GCS: GCS eye subscore is 4. GCS verbal subscore is 5. GCS motor subscore is 6. Cranial Nerves: Cranial nerve deficit and dysarthria present. No facial asymmetry. Sensory: No sensory deficit.       Comments: Oriented to person and place, disoriented to time       DIFFERENTIAL  DIAGNOSIS     PLAN (LABS / IMAGING / EKG):  Orders Placed This Encounter   Procedures    CT HEAD WO CONTRAST    CTA HEAD NECK W CONTRAST    MRI BRAIN WO CONTRAST    STROKE PANEL    ELECTROLYTES PLUS    Hemoglobin and hematocrit, blood    CALCIUM, IONIC (POC)    Venous Blood Gas, POC    Creatinine W/GFR Point of Care    POCT urea (BUN)    Lactic Acid, POC    POCT Glucose    EKG 12 Lead    ADMIT TO INPATIENT       MEDICATIONS ORDERED:  Orders Placed This Encounter   Medications    iopamidol (ISOVUE-370) 76 % injection 90 mL       DDX: Hypoglycemia, electrolyte abnormality, CVA, intracranial bleed    Initial MDM/Plan: 62 y.o. male who presents with EMS due to concerns for aphasia. Patient had recent stroke in the past, unsure if this is patient's baseline or new. Patient is alert only to person and place, disoriented to time. He does have aphasia noted on exam, no weakness, stroke alert noncritical paged out. We will plan for CT scan and blood work. DIAGNOSTIC RESULTS / EMERGENCYDEPARTMENT COURSE / MDM     LABS:  Labs Reviewed   VENOUS BLOOD GAS, POINT OF CARE - Abnormal; Notable for the following components:       Result Value    pH, Sal 7.461 (*)     pCO2, Sal 34.6 (*)     All other components within normal limits   LACTIC ACID,POINT OF CARE - Abnormal; Notable for the following components:    POC Lactic Acid 1.54 (*)     All other components within normal limits   STROKE PANEL   ELECTROLYTES PLUS   HGB/HCT   CALCIUM, IONIC (POC)   CREATININE W/GFR POINT OF CARE   UREA N (POC)   POCT GLUCOSE         RADIOLOGY:  CT HEAD WO CONTRAST    Result Date: 12/1/2022  EXAMINATION: CT OF THE HEAD WITHOUT CONTRAST  12/1/2022 11:39 am TECHNIQUE: CT of the head was performed without the administration of intravenous contrast. Automated exposure control, iterative reconstruction, and/or weight based adjustment of the mA/kV was utilized to reduce the radiation dose to as low as reasonably achievable. COMPARISON: None.  HISTORY: ORDERING SYSTEM PROVIDED HISTORY: AMS TECHNOLOGIST PROVIDED HISTORY: AMS Decision Support Exception - unselect if not a suspected or confirmed emergency medical condition->Emergency Medical Condition (MA) Reason for Exam: Aphasia FINDINGS: BRAIN/VENTRICLES: There is no acute intracranial hemorrhage, mass effect, or midline shift. There is satisfactory overall gray-white matter differentiation. There is a small remote lacunar infarct in left basal ganglia. The ventricular structures are symmetric and unremarkable. The infratentorial structures are unremarkable. ORBITS: The visualized portion of the orbits demonstrate no acute abnormality. SINUSES: The visualized paranasal sinuses and mastoid air cells demonstrate no acute abnormality. SOFT TISSUES/SKULL:  No acute abnormality of the visualized skull or soft tissues. No acute intracranial abnormality. Remote lacunar infarct in the left basal ganglia. Findings were discussed with Daniel Fink at 12:16 pm on 12/1/2022. CTA HEAD NECK W CONTRAST    Result Date: 12/1/2022  EXAMINATION: CTA OF THE HEAD AND NECK WITH CONTRAST 12/1/2022 11:39 am: TECHNIQUE: CTA of the head and neck was performed with the administration of intravenous contrast. Multiplanar reformatted images are provided for review. MIP images are provided for review. Stenosis of the internal carotid arteries measured using NASCET criteria. Automated exposure control, iterative reconstruction, and/or weight based adjustment of the mA/kV was utilized to reduce the radiation dose to as low as reasonably achievable. COMPARISON: None. HISTORY: ORDERING SYSTEM PROVIDED HISTORY: Aphasia TECHNOLOGIST PROVIDED HISTORY: Aphasia Decision Support Exception - unselect if not a suspected or confirmed emergency medical condition->Emergency Medical Condition (MA) Reason for Exam: Aphasia FINDINGS: CTA NECK: AORTIC ARCH/ARCH VESSELS: No dissection or arterial injury. No significant stenosis of the brachiocephalic or subclavian arteries. Mild stenosis at the origin of bilateral vertebral arteries.  CAROTID ARTERIES: No dissection, arterial injury. There is mild atherosclerotic calcification of bilateral carotid bulbs extending into the origin of internal carotid arteries resulting in mild 40% stenosis at the origin of right internal carotid artery. VERTEBRAL ARTERIES: No dissection, arterial injury, or significant stenosis. Left vertebral artery is dominant. SOFT TISSUES: The lung apices are clear. No cervical or superior mediastinal lymphadenopathy. The larynx and pharynx are unremarkable. No acute abnormality of the salivary and thyroid glands. BONES: No acute osseous abnormality. CTA HEAD: ANTERIOR CIRCULATION: No significant stenosis of the intracranial internal carotid, anterior cerebral, or middle cerebral arteries. No aneurysm. POSTERIOR CIRCULATION: Multifocal areas of mild stenosis involving the basilar artery. Fetal configuration of right PCA. No significant stenosis of the vertebral, basilar, or posterior cerebral arteries. No aneurysm. OTHER: No dural venous sinus thrombosis on this non-dedicated study. BRAIN: For detailed description please refer to the same-day head CT. .     Mild stenosis at the origin of bilateral vertebral arteries. Mild 40% stenosis at the origin of right internal carotid artery. Multifocal areas of mild stenosis involving the basilar artery. Otherwise unremarkable CTA of the head and neck.      MRI BRAIN WO CONTRAST    Result Date: 12/1/2022  EXAMINATION: MRI OF THE BRAIN WITHOUT CONTRAST,  12/1/2022 1:56 pm TECHNIQUE: Multiplanar multisequence MRI of the brain was performed without the administration of intravenous contrast. COMPARISON: None HISTORY: ORDERING SYSTEM PROVIDED HISTORY: Aphasia TECHNOLOGIST PROVIDED HISTORY: Aphasia Decision Support Exception - unselect if not a suspected or confirmed emergency medical condition->Emergency Medical Condition (MA) Reason for Exam:  Aphasia FINDINGS: INTRACRANIAL STRUCTURES/VENTRICLES: The there is a 7 mm focus of mild diffusion hyperintensity in the left side of the anterior body of the corpus callosum. There is no corresponding decrease in apparent diffusion coefficient. There is associated T2 hyperintensity. Likely this represents a subacute infarct. A few punctate foci of susceptibility artifact in the central brain likely represent chronic hemosiderin deposition. There are chronic lacunar infarcts in the periventricular white matter, basal ganglia, and cerebellar white matter/left middle cerebellar peduncle. There is wallerian degeneration on the left corticospinal tract. Scattered white matter abnormalities are nonspecific but commonly attributed to chronic microvascular ischemia. There is no acute hemorrhage, herniation, or hydrocephalus. ORBITS: The visualized portion of the orbits demonstrate no acute abnormality. SINUSES: The visualized paranasal sinuses and mastoid air cells demonstrate no acute abnormality. BONES/SOFT TISSUES: The bone marrow signal intensity appears normal. The soft tissues demonstrate no acute abnormality. Small subacute infarct in the left anterior corpus callosum. Small chronic infarcts and microvascular disease likely representing chronic hypertensive encephalopathy. EKG      All EKG's are interpreted by the Emergency Department Physicianwho either signs or Co-signs this chart in the absence of a cardiologist.    EMERGENCY DEPARTMENT COURSE:  ED Course as of 12/01/22 1534   u Dec 01, 2022   1143 Patient seen unremarkable, does have aphasia and alert only to person and place not time. Stroke alert paged [CD]   [phone] Stroke team at bedside [CD]   [phone] Lab work essentially unremarkable. Discussed with stroke team they are recommending MRI. [CD]   4314 IMPRESSION:  No acute intracranial abnormality. Remote lacunar infarct in the left basal ganglia. [CD]   [phone] Spoke with stroke team.  Recommending MRI to evaluate for new stroke.   Stroke team states that his symptoms are consistent with his previous stroke that are unchanged today. [CD]   7568 MRI demonstrating small subacute infarct in the corpus callosum. [CD]   1534 Neuro planning for admission. [CD]      ED Course User Index  [CD] Gayle Aguilar DO          PROCEDURES:  None    CONSULTS:  None    CRITICAL CARE:  45 minutes    FINAL IMPRESSION      1. Cerebrovascular accident (CVA), unspecified mechanism ([facility] [address]          DISPOSITION / [address] Admitted 12/01/2022 03:32:41 PM      PATIENT REFERRED TO:  No follow-up provider specified.     DISCHARGE MEDICATIONS:  New Prescriptions    No medications on file       Gayle Aguilar DO  Emergency Medicine Resident    (Please note that portions of this note were completed with a voice recognition program.Efforts were made to edit the dictations but occasionally words are mis-transcribed.)        Gayle Aguilar DO  Resident  12/01/22 1535

## 2022-12-01 NOTE — PROGRESS NOTES
put patient's walker back in his room and bagged his belongings which included his keys, medications and his cell phone. Patient questioned why his mom and sister had not come and  helped him call his mom.     Electronically signed by Remington Roberts on 12/1/2022 at 3:53 PM.  Rikki Camacho  [phone]

## 2022-12-01 NOTE — PROGRESS NOTES
[facility]     Emergency/Trauma Note    PATIENT NAME: Wale Purcell    Shift date: 12/1/2022   Shift day: Thursday   Shift # 1    Room # 12/12   Name: Wlae Purcell            Age: 62 y.o. Gender: male          Adventism: Other   Place of Rastafarian:     Trauma/Incident type: Stroke Alert  Admit Date & Time: 12/1/2022 11:28 AM      PATIENT/EVENT DESCRIPTION:  Wale Purcell is a 62 y.o. male who arrived via ground ambulance from a doctor's appointment. Pt to be admitted to 12/12. SPIRITUAL ASSESSMENT-INTERVENTION-OUTCOME:  Assessment: Patient's speech was fairly understandable. He engaged well in conversation speaking at length about his children and grandchildren. He said he wants to be around long enough to see his grandchildren grow up. Patient asked  to call his mom and sister to notify them that he is here. He gave gave  the names and numbers of his three children. Intervention:  provided a supportive presence through active listening and words of affirmation.  called mom and sister and input children's info into chart. Outcome: Patient appeared to enjoy visit and thanked . PATIENT BELONGINGS:  With patient    ANY BELONGINGS OF SIGNIFICANT VALUE NOTED:      REGISTRATION STAFF NOTIFIED? No      WHAT IS YOUR SPIRITUAL CARE PLAN FOR THIS PATIENT?:   Chaplains will remain available to offer spiritual and emotional support as needed. Electronically signed by Juan Luis Lawson on 12/1/2022 at 1:25 PM.  [facility]  [phone]        12/01/22 1319   Encounter Summary   Service Provided For: Patient   Referral/Consult From: Multi-disciplinary team   Support System Children;Parent; Family members   Last Encounter  12/01/22   Complexity of Encounter Low   Begin Time 0100   End Time  0130   Total Time Calculated 30 min   Assessment/Intervention/Outcome   Assessment Calm;Coping   Intervention Active listening;Explored/Affirmed feelings, thoughts, concerns;Prayer (assurance of)/Royal   Outcome Engaged in conversation;Expressed feelings, needs, and concerns;Expressed Gratitude;Receptive

## 2022-12-01 NOTE — CONSULTS
[facility]  Office: [address], DO, Cecily Ray, DO, Kavitha Walton, DO, Charles Leach Blood, DO, Tanvir Elias MD, Niranjan Fish MD, Kehinde Ureña MD, Kyle Watkins MD,  Bobbi Mccoy MD, Marianne Dodson MD, Analy Martinez, DO, Harlan Erickson MD,  Ford Montero MD, Joy Adler MD, Yan Lanza, DO, Marifer Nolasco MD, Dada Laboy MD, Jazlyn Becker DO, Eemrson Santana MD, Chico Araiza MD, Antoinette Sanchez MD, Chris Wu MD, Rosenda Lopez, DO, Pauline Harrington MD, Radha Brewer MD, Lexii Lux, CNP,  Ora Oden, CNP, Ric Gee, CNP, Ni Contreras, CNP,  Mary Diggs, [facility], Indira Veloz, CNP, Sara Tavarez, CNP, Destini Lim, CNP, Tonia Choudhury, CNP, Victor Manuel Macias, CNP, Mary Spivey PA-C, Vinicius Grullon, CNS, Simeon Andrews, CNP, Марина Naqvi, CNP         [address] / HISTORY AND PHYSICAL EXAMINATION            Date:   12/1/2022  Patient name:  Anna Marie Adamson  Date of admission:  12/1/2022 11:28 AM  MRN:   [id number]  Account:  [de-identified]  YOB: 1965  PCP:    No primary care provider on file. Room:   12/12  Code Status:    No Order    Physician Requesting Consult: Jason Ruffin DO    Reason for Consult:  BP management     Chief Complaint:     Chief Complaint   Patient presents with    Altered Mental Status    Aphasia              History Obtained From:     patient, electronic medical record    History of Present Illness:     62 M who presented aphasia and confusion. Initially presented to physician appointment, noted to be altered, sent to ER. Found to have small subacute infarct in the left anterior corpus callosum on MRI. BP noted to be in 200's. IM consulted for medical management. Patient on multiple medications for BP at home, states compliant with home medications. Denies any focal deficits, oriented to person/place.  Having intermittent confusion. Past Medical History:     Past Medical History:   Diagnosis Date    HTN (hypertension)     Obesity         Past Surgical History:     Past Surgical History:   Procedure Laterality Date    NERVE BLOCK  08/05/2013    CAUDAL #1- DECADRON 10 MG. NERVE BLOCK  9/10/13    caudal #2 decadron 7.5 mg    NERVE BLOCK  3/28/14    duramorph 1.5mg  celestone 9mg        Medications Prior to Admission:     Prior to Admission medications    Medication Sig Start Date End Date Taking? Authorizing Provider   acetaminophen-codeine (TYLENOL/CODEINE #3) 300-30 MG per tablet Take 1 tablet by mouth every 8 hours as needed for Pain. Historical Provider, MD   amLODIPine (NORVASC) 10 MG tablet Take 10 mg by mouth daily. Historical Provider, MD   hydrochlorothiazide (HYDRODIURIL) 25 MG tablet Take 25 mg by mouth daily. Historical Provider, MD   lisinopril (PRINIVIL;ZESTRIL) 40 MG tablet Take 40 mg by mouth daily. Historical Provider, MD   nebivolol (BYSTOLIC) 5 MG tablet Take 5 mg by mouth daily. Historical Provider, MD   traMADol (ULTRAM) 50 MG tablet Take 50 mg by mouth every 8 hours as needed. Historical Provider, MD        Allergies:     Patient has no known allergies. Social History:     Tobacco:    has no history on file for tobacco use. Alcohol:      has no history on file for alcohol use. Drug Use:  has no history on file for drug use. Family History:     Patient denies any pertinent family history     Review of Systems:     Positive and Negative as described in HPI. CONSTITUTIONAL:  negative for fevers, chills, sweats, fatigue, weight loss  HEENT:  negative for vision, hearing changes, runny nose, throat pain  RESPIRATORY:  negative for shortness of breath, cough, congestion, wheezing. CARDIOVASCULAR:  negative for chest pain, palpitations.   GASTROINTESTINAL:  negative for nausea, vomiting, diarrhea, constipation, change in bowel habits, abdominal pain   GENITOURINARY: negative for difficulty of urination, burning with urination, frequency   INTEGUMENT:  negative for rash, skin lesions, easy bruising   HEMATOLOGIC/LYMPHATIC:  negative for swelling/edema   ALLERGIC/IMMUNOLOGIC:  negative for urticaria , itching  ENDOCRINE:  negative increase in drinking, increase in urination, hot or cold intolerance  MUSCULOSKELETAL:  negative joint pains, muscle aches, swelling of joints  NEUROLOGICAL:  negative for headaches, dizziness, lightheadedness, numbness, pain, tingling extremities. Positive for intermittent confusion   BEHAVIOR/PSYCH:  negative for depression, anxiety    Physical Exam:     BP (!) 193/168   Pulse 78   Temp 99.1 °F (37.3 °C) (Oral)   Resp 19   Ht 6' (1.829 m)   Wt 300 lb (136.1 kg)   SpO2 99%   BMI 40.69 kg/m²   Temp (24hrs), Av.1 °F (37.3 °C), Min:99.1 °F (37.3 °C), Max:99.1 °F (37.3 °C)    Recent Labs     22  1138   POCGLU 94     No intake or output data in the 24 hours ending 22 1804    General Appearance:  alert, well appearing, and in no acute distress  Mental status: oriented to person, place, and time with normal affect, intermittent confusion   Head:  normocephalic, atraumatic.   Eye: no icterus, redness, pupils equal and reactive, extraocular eye movements intact, conjunctiva clear  Ear: normal external ear, no discharge, hearing intact  Nose:  no drainage noted  Mouth: mucous membranes moist  Neck: supple, no carotid bruits, thyroid not palpable  Lungs: Bilateral equal air entry, clear to ausculation, no wheezing, rales or rhonchi, normal effort  Cardiovascular: normal rate, regular rhythm  Abdomen: Soft, nontender, nondistended, normal bowel sounds  Neurologic: There are no new focal motor or sensory deficits, normal muscle tone and bulk, no abnormal sensation, normal speech, cranial nerves II through XII grossly intact  Skin: No gross lesions, rashes, bruising or bleeding on exposed skin area  Extremities:  peripheral pulses palpable, no pedal edema or calf pain with palpation  Psych: normal affect    Investigations:      Laboratory Testing:  Recent Results (from the past 24 hour(s))   Venous Blood Gas, POC    Collection Time: 12/01/22 11:38 AM   Result Value Ref Range    pH, Sal 7.461 (H) 7.320 - 7.430    pCO2, Sal 34.6 (L) 41.0 - 51.0 mm Hg    pO2, Sal 47.0 30.0 - 50.0 mm Hg    HCO3, Venous 24.7 22.0 - 29.0 mmol/L    Positive Base Excess, Sal 1 0.0 - 3.0    O2 Sat, Sal 85 60.0 - 85.0 %   ELECTROLYTES PLUS    Collection Time: 12/01/22 11:38 AM   Result Value Ref Range    POC Sodium 144 138 - 146 mmol/L    POC Potassium 3.7 3.5 - 4.5 mmol/L    POC Chloride 107 98 - 107 mmol/L    POC TCO2 24 22 - 30 mmol/L    Anion Gap 14 7 - 16 mmol/L   Hemoglobin and hematocrit, blood    Collection Time: 12/01/22 11:38 AM   Result Value Ref Range    POC Hemoglobin 16.2 13.5 - 17.5 g/dL    POC Hematocrit 47 41 - 53 %   Creatinine W/GFR Point of Care    Collection Time: 12/01/22 11:38 AM   Result Value Ref Range    POC Creatinine 1.07 0.51 - 1.19 mg/dL    eGFR, POC >60 mL/min/1.73m2   CALCIUM, IONIC (POC)    Collection Time: 12/01/22 11:38 AM   Result Value Ref Range    POC Ionized Calcium 1.20 1. 15 - 1.33 mmol/L   POCT urea (BUN)    Collection Time: 12/01/22 11:38 AM   Result Value Ref Range    POC BUN 20 8 - 26 mg/dL   Lactic Acid, POC    Collection Time: 12/01/22 11:38 AM   Result Value Ref Range    POC Lactic Acid 1.54 (H) 0.56 - 1.39 mmol/L   POCT Glucose    Collection Time: 12/01/22 11:38 AM   Result Value Ref Range    POC Glucose 94 74 - 100 mg/dL   STROKE PANEL    Collection Time: 12/01/22 11:45 AM   Result Value Ref Range    Glucose 93 70 - 99 mg/dL    BUN 19 6 - 20 mg/dL    Creatinine 0.89 0.70 - 1.20 mg/dL    Est, Glom Filt Rate >60 >60 mL/min/1.73m2    Calcium 9.4 8.6 - 10.4 mg/dL    Sodium 138 135 - 144 mmol/L    Potassium 4.0 3.7 - 5.3 mmol/L    Chloride 104 98 - 107 mmol/L    CO2 21 20 - 31 mmol/L    Anion Gap 13 9 - 17 mmol/L    WBC 7.1 3.5 - 11.3 k/uL    RBC 5.13 4.21 - 5.77 m/uL    Hemoglobin 15.6 13.0 - 17.0 g/dL    Hematocrit 46.0 40.7 - 50.3 %    MCV 89.7 82.6 - 102.9 fL    MCH 30.4 25.2 - 33.5 pg    MCHC 33.9 28.4 - 34.8 g/dL    RDW 13.1 11.8 - 14.4 %    Platelets 565 333 - 196 k/uL    MPV 11.9 8.1 - 13.5 fL    NRBC Automated 0.0 0.0 per 100 WBC    Total  39 - 308 U/L    Seg Neutrophils 56 36 - 65 %    Lymphocytes 32 24 - 43 %    Monocytes 9 3 - 12 %    Eosinophils % 2 1 - 4 %    Basophils 1 0 - 2 %    Immature Granulocytes 0 0 %    Segs Absolute 3.99 1.50 - 8.10 k/uL    Absolute Lymph # 2.25 1.10 - 3.70 k/uL    Absolute Mono # 0.64 0.10 - 1.20 k/uL    Absolute Eos # 0.17 0.00 - 0.44 k/uL    Basophils Absolute 0.05 0.00 - 0.20 k/uL    Absolute Immature Granulocyte <0.03 0.00 - 0.30 k/uL    Myoglobin 51 28 - 72 ng/mL    Protime 10.4 9.1 - 12.3 sec    INR 1.0     PTT 22.2 20.5 - 30.5 sec    Troponin, High Sensitivity 16 0 - 22 ng/L       Imaging/Diagonstics:  CT HEAD WO CONTRAST    Result Date: 12/1/2022  No acute intracranial abnormality. Remote lacunar infarct in the left basal ganglia. Findings were discussed with Temi Irvin at 12:16 pm on 12/1/2022. CTA HEAD NECK W CONTRAST    Result Date: 12/1/2022  Mild stenosis at the origin of bilateral vertebral arteries. Mild 40% stenosis at the origin of right internal carotid artery. Multifocal areas of mild stenosis involving the basilar artery. Otherwise unremarkable CTA of the head and neck. MRI BRAIN WO CONTRAST    Result Date: 12/1/2022  Small subacute infarct in the left anterior corpus callosum. Small chronic infarcts and microvascular disease likely representing chronic hypertensive encephalopathy.        Assessment :      Hospital Problems             Last Modified POA    * (Principal) Ischemic stroke ([address] 12/1/2022 Yes    Hypertensive emergency 12/1/2022 Yes       Plan:     - Vitals, labs, imaging, medications reviewed  - BP goals per neurology, discussed with neuro, no need for permissive HTN due to subacute CVA  - Recommend labetalol PRN   - Can start cardene infusion if HTN resistant to labetalol  - Resume home BP medications - OK to normalize BP per primary  - Further stroke workup per primary service    Thank you for consult, call with questions     Consultations:   Alix Vazquez MD  12/1/2022  6:04 PM

## 2022-12-01 NOTE — ED TRIAGE NOTES
Pt brought to room 12 via EMS LS11 stretcher with c/o mild aphasia, confusion to time and event and slurred speech. EMS stated that pt was at his 1st appt with physicians office when his condition was noticed by staff who called 911. Pt is said to have had a stroke 1 month ago. No other complaints at this time. Breathing is non-labored. Call light is within reach.

## 2022-12-02 PROBLEM — I63.81 LACUNAR STROKE (HCC): Status: ACTIVE | Noted: 2022-12-02

## 2022-12-02 LAB
EKG ATRIAL RATE: 90 BPM
EKG P AXIS: 34 DEGREES
EKG P-R INTERVAL: 174 MS
EKG Q-T INTERVAL: 384 MS
EKG QRS DURATION: 94 MS
EKG QTC CALCULATION (BAZETT): 469 MS
EKG R AXIS: 0 DEGREES
EKG T AXIS: 29 DEGREES
EKG VENTRICULAR RATE: 90 BPM
HCT VFR BLD CALC: 41.7 % (ref 40.7–50.3)
HEMOGLOBIN: 13.2 G/DL (ref 13–17)
MCH RBC QN AUTO: 29.6 PG (ref 25.2–33.5)
MCHC RBC AUTO-ENTMCNC: 31.7 G/DL (ref 28.4–34.8)
MCV RBC AUTO: 93.5 FL (ref 82.6–102.9)
NRBC AUTOMATED: 0 PER 100 WBC
PDW BLD-RTO: 13.2 % (ref 11.8–14.4)
PLATELET # BLD: 146 K/UL (ref 138–453)
PMV BLD AUTO: 11.4 FL (ref 8.1–13.5)
RBC # BLD: 4.46 M/UL (ref 4.21–5.77)
WBC # BLD: 6.6 K/UL (ref 3.5–11.3)

## 2022-12-02 PROCEDURE — 2500000003 HC RX 250 WO HCPCS

## 2022-12-02 PROCEDURE — 36415 COLL VENOUS BLD VENIPUNCTURE: CPT

## 2022-12-02 PROCEDURE — 2060000000 HC ICU INTERMEDIATE R&B

## 2022-12-02 PROCEDURE — 6360000002 HC RX W HCPCS

## 2022-12-02 PROCEDURE — 99232 SBSQ HOSP IP/OBS MODERATE 35: CPT | Performed by: INTERNAL MEDICINE

## 2022-12-02 PROCEDURE — 97535 SELF CARE MNGMENT TRAINING: CPT

## 2022-12-02 PROCEDURE — 97530 THERAPEUTIC ACTIVITIES: CPT

## 2022-12-02 PROCEDURE — 6370000000 HC RX 637 (ALT 250 FOR IP): Performed by: INTERNAL MEDICINE

## 2022-12-02 PROCEDURE — 99232 SBSQ HOSP IP/OBS MODERATE 35: CPT | Performed by: PSYCHIATRY & NEUROLOGY

## 2022-12-02 PROCEDURE — 6370000000 HC RX 637 (ALT 250 FOR IP)

## 2022-12-02 PROCEDURE — 97162 PT EVAL MOD COMPLEX 30 MIN: CPT

## 2022-12-02 PROCEDURE — 92523 SPEECH SOUND LANG COMPREHEN: CPT

## 2022-12-02 PROCEDURE — 85027 COMPLETE CBC AUTOMATED: CPT

## 2022-12-02 PROCEDURE — 97166 OT EVAL MOD COMPLEX 45 MIN: CPT

## 2022-12-02 RX ADMIN — AMLODIPINE BESYLATE 10 MG: 10 TABLET ORAL at 09:25

## 2022-12-02 RX ADMIN — DESMOPRESSIN ACETATE 40 MG: 0.2 TABLET ORAL at 21:41

## 2022-12-02 RX ADMIN — HYDROCHLOROTHIAZIDE 25 MG: 25 TABLET ORAL at 09:25

## 2022-12-02 RX ADMIN — CLOPIDOGREL 75 MG: 75 TABLET, FILM COATED ORAL at 09:25

## 2022-12-02 RX ADMIN — Medication 81 MG: at 09:25

## 2022-12-02 RX ADMIN — ENOXAPARIN SODIUM 30 MG: 100 INJECTION SUBCUTANEOUS at 21:41

## 2022-12-02 RX ADMIN — LISINOPRIL 40 MG: 20 TABLET ORAL at 09:25

## 2022-12-02 RX ADMIN — ENOXAPARIN SODIUM 30 MG: 100 INJECTION SUBCUTANEOUS at 09:25

## 2022-12-02 RX ADMIN — Medication 10 MG: at 01:38

## 2022-12-02 ASSESSMENT — PAIN DESCRIPTION - LOCATION
LOCATION: KNEE
LOCATION: KNEE

## 2022-12-02 ASSESSMENT — PAIN SCALES - GENERAL
PAINLEVEL_OUTOF10: 5
PAINLEVEL_OUTOF10: 5

## 2022-12-02 ASSESSMENT — PAIN DESCRIPTION - ONSET: ONSET: ON-GOING

## 2022-12-02 ASSESSMENT — PAIN DESCRIPTION - DESCRIPTORS: DESCRIPTORS: CRAMPING;ACHING

## 2022-12-02 ASSESSMENT — PAIN DESCRIPTION - FREQUENCY: FREQUENCY: CONTINUOUS

## 2022-12-02 ASSESSMENT — PAIN DESCRIPTION - ORIENTATION
ORIENTATION: RIGHT
ORIENTATION: RIGHT

## 2022-12-02 ASSESSMENT — PAIN - FUNCTIONAL ASSESSMENT: PAIN_FUNCTIONAL_ASSESSMENT: PREVENTS OR INTERFERES WITH MANY ACTIVE NOT PASSIVE ACTIVITIES

## 2022-12-02 ASSESSMENT — PAIN DESCRIPTION - PAIN TYPE: TYPE: ACUTE PAIN;CHRONIC PAIN

## 2022-12-02 NOTE — PROGRESS NOTES
Physical Therapy  Facility/Department: [address] NEURO ICU  Physical Therapy Initial Assessment    Name: Rodolfo Shearer  : 1965  MRN: [id number]  Date of Service: 2022  Chief Complaint   Patient presents with    Altered Mental Status    Aphasia              Discharge Recommendations: Further therapy recommended at discharge. The patient should be able to tolerate at least 3 hours of therapy per day over 5 days or 15 hours over 7 days. This patient may benefit from a Physical Medicine and Rehab consult. PT Equipment Recommendations  Equipment Needed: No      Patient Diagnosis(es): The encounter diagnosis was Cerebrovascular accident (CVA), unspecified mechanism ([facility] [address] Past Medical History:  has a past medical history of HTN (hypertension) and Obesity. Past Surgical History:  has a past surgical history that includes Nerve Block (2013); Nerve Block (9/10/13); and Nerve Block (3/28/14). Assessment   Body Structures, Functions, Activity Limitations Requiring Skilled Therapeutic Intervention: Decreased functional mobility ; Decreased endurance;Decreased strength;Decreased balance  Assessment: The pt ambulated 30ft + 40ft with RW and CGA with Mod verbal cues throughout to maintain RW in DEEPA and appropriate navigate obstacles. Recommend continued PT to address deficits and progress toward prior level of independence. The pt currently requires 24hr assistance due to increased risk of falls.   Therapy Prognosis: Good  Decision Making: Medium Complexity  Requires PT Follow-Up: Yes  Activity Tolerance  Activity Tolerance: Patient limited by endurance     Plan   Physcial Therapy Plan  General Plan:  (5-6x/wk)  Current Treatment Recommendations: Strengthening, ROM, Balance training, Functional mobility training, Transfer training, Endurance training, Gait training, Home exercise program, Equipment evaluation, education, & procurement, Patient/Caregiver education & training, Neuromuscular re-education  Safety Devices  Type of Devices: Gait belt, Nurse notified, Call light within reach, Left in chair, Chair alarm in place  Restraints  Restraints Initially in Place: No     Restrictions  Restrictions/Precautions  Restrictions/Precautions: Up as Tolerated, Fall Risk  Required Braces or Orthoses?: No  Position Activity Restriction  Other position/activity restrictions: aphasia     Subjective   General  Patient assessed for rehabilitation services?: Yes  Response To Previous Treatment: Not applicable  Family / Caregiver Present: No  Follows Commands: Within Functional Limits  Subjective  Subjective: RN and pt agreeable to PT. Pt sitting EOB upon arrival, pleasant and cooperaitve throughout. Pt reports pain 5/10 in R knee. Word finding difficulties noted throughout eval, pt intermittently tearful. Social/Functional History  Social/Functional History  Lives With: Significant other  Type of Home: Apartment  Home Layout: One level  Home Access: Level entry  Bathroom Shower/Tub: Tub/Shower unit  Bathroom Toilet: Standard  Bathroom Equipment: Grab bars in shower  Bathroom Accessibility: Not accessible  Home Equipment: [address] (pt reports ambulating with rollator at baseline)  Receives Help From: Family  ADL Assistance: [facility] [address]: Independent  Homemaking Responsibilities: Yes  Ambulation Assistance: Independent  Transfer Assistance: Independent  Active : Yes  Mode of Transportation: Truck  Occupation: Retired  Leisure & Hobbies: Enjoys driving truck  Additional Comments: Pt reports significant other works, otherwise able to assist.  [address]: Impaired (pt reports acute increased blurred vision)  Hearing  Hearing: Within functional limits    Cognition   Orientation  Overall Orientation Status: Impaired  Orientation Level: Disoriented to place; Disoriented to time (pt reports \"[facility]\")  Cognition  Overall Cognitive Status: Exceptions  Following Commands:  Follows multistep commands with repitition  Attention Span: Attends with cues to redirect  Safety Judgement: Decreased awareness of need for assistance  Problem Solving: Assistance required to generate solutions;Assistance required to correct errors made  Insights: Decreased awareness of deficits  Initiation: Requires cues for some  Sequencing: Requires cues for some     Objective     Gross Assessment  Tone: Normal  Sensation: Intact (pt denies numbness and tingling)     Joint Mobility  Spine: WFL  ROM RLE: lacking ~10 degrees of knee extension  ROM LLE: WFL  ROM RUE: WFL  ROM LUE: WFL  Strength RLE  Strength RLE: WFL  Strength LLE  Strength LLE: WFL  Strength RUE  Strength RUE: WFL  Comment: formally assessed by OT  Strength LUE  Strength LUE: WFL  Comment: formally assessed by OT           Bed mobility  Bed Mobility Comments: pt sitting EOB upon arrival, returned to sitting following mobility  Transfers  Sit to Stand: Contact guard assistance  Stand to Sit: Contact guard assistance  Bed to Chair: Contact guard assistance  Comment: transfers performed with RW, verbal cues for UE placement and to maintain RW in DEEPA with fair return and poor carryover  Ambulation  Surface: Level tile  Device: Rolling Walker  Assistance: Contact guard assistance  Quality of Gait: mod verbal cues to maintain RW in DEEPA with fair return; lacks R heel strike due to HS tightness, poor R toe clearance; high reliance on RW  Gait Deviations: Slow Isabel;Decreased step length;Decreased step height  Distance: 30ft + 40ft  Stairs/Curb  Stairs?: No     Balance  Posture: Fair  Sitting - Static: Good;-  Sitting - Dynamic: Good;-  Standing - Static: Fair  Standing - Dynamic: Fair  Comments: standing balance assessed with RW         AM-PAC Score  AM-PAC Inpatient Mobility Raw Score : 17 (12/02/22 1246)  AM-PAC Inpatient T-Scale Score : 42.13 (12/02/22 1246)  Mobility Inpatient CMS 0-100% Score: 50.57 (12/02/22 1246)  Mobility Inpatient CMS G-Code Modifier : CK (12/02/22 1246)        Goals  Short Term Goals  Time Frame for Short Term Goals: 14 visits  Short Term Goal 1: Perform bed mobility and functional transfers independently  Short Term Goal 2: Ambulate 300ft with least restrictive AD and supervision  Short Term Goal 3: Demo Good- dynamic standing balance to decrease risk of falls  Short Term Goal 4: Achieve full R knee extension to improve gait mechanics       Education  Patient Education  Education Given To: Patient  Education Provided: Plan of Care;Role of Therapy;Home Exercise Program  Education Provided Comments: reviewed R HS stretch to improve knee ROM  Education Method: Verbal;Demonstration; Teach Back  Barriers to Learning: Cognition  Education Outcome: Verbalized understanding;Continued education needed      Therapy Time   Individual Concurrent Group Co-treatment   Time In 1021         Time Out 1054         Minutes 33         Timed Code Treatment Minutes: 8 Minutes; co-eval with OT       Starr Coyle, PT

## 2022-12-02 NOTE — H&P
[address] [facility] Neurology   [address] [place] [address]    HISTORY AND PHYSICAL EXAMINATION            Date:   12/1/2022  Patient name:  Wale Purcell  Date of admission:  12/1/2022 11:28 AM  MRN:   [id number]  Account:  [de-identified]  YOB: 1965  PCP:    No primary care provider on file. Room:   12/12  Code Status:    No Order    Chief Complaint:     Chief Complaint   Patient presents with    Altered Mental Status    Aphasia            History Obtained From:     family member - son, mother, electronic medical record    History of Present Illness: The patient is a 62 y.o. male past medical history of HTN, obesity cryptogenic stroke 10/2022 possible TAL, who presents with difficulty speaking. Last know well: unknown. Patient currently is in a facility for rehab after his recent stroke, he had a follow-up appointment, while at the facility he became more aphasic and requested to be sent to ED. During that time patient was not complaining of any weakness, any headache or blurry vision. Denies any nausea or vomiting. His mother states that he was having difficulty speaking due to previous stroke. His son states that the patient does not seem like himself, unable to describe what has changed. Patient lives with his girlfriend, unable to get hold of her. On presentation: BP was 172/141, and BSL: 94. Prior to arrival patient was on  Aspirin 81, patient completed a course of Plavix 2 weeks, Lipitor 40 mg.   Smoking history: smoker    CT head was done Showed a remote lacunar infarct of the left basal ganglia, no acute abnormalities. CTA head and neck showing mild stenosis at the origin of bilateral vertebral arteries, 40% stenosis of the right ICA origin, multifocal mild stenosis of the basilar artery. In October was seen in [facility] for high blood pressure apparently patient was unable to take his medication, was seen at [address] [place] [address] around 10/8/2022 for hypertensive emergency. Apparently patient was having dysarthria and seemed like a chronic issue known to the patientat that time patient had an MRI brain without contrast showing an acute ischemia in the left subinsular region and right cerebellum. Hemoglobin A1c 5.5, . Echo was negative for bubble study, MRA head and neck were negative for carotid stenosis. MRI abdomen as work-up for renal artery stenosis was normal.  Patient was discharged on dual antiplatelet therapy, Plavix for 2 weeks. Patient was on permissive hypertension until MRI brain wo showed Small subacute infarct in the left anterior corpus callosum. Patient was admitted to neurology service for monitoring and blood pressure management. Past Medical History:     Past Medical History:   Diagnosis Date    HTN (hypertension)     Obesity         Past Surgical History:     Past Surgical History:   Procedure Laterality Date    NERVE BLOCK  08/05/2013    CAUDAL #1- DECADRON 10 MG. NERVE BLOCK  9/10/13    caudal #2 decadron 7.5 mg    NERVE BLOCK  3/28/14    duramorph 1.5mg  celestone 9mg        Medications Prior to Admission:     Prior to Admission medications    Medication Sig Start Date End Date Taking? Authorizing Provider   acetaminophen-codeine (TYLENOL/CODEINE #3) 300-30 MG per tablet Take 1 tablet by mouth every 8 hours as needed for Pain. Historical Provider, MD   amLODIPine (NORVASC) 10 MG tablet Take 10 mg by mouth daily. Historical Provider, MD   hydrochlorothiazide (HYDRODIURIL) 25 MG tablet Take 25 mg by mouth daily. Historical Provider, MD   lisinopril (PRINIVIL;ZESTRIL) 40 MG tablet Take 40 mg by mouth daily. Historical Provider, MD   nebivolol (BYSTOLIC) 5 MG tablet Take 5 mg by mouth daily. Historical Provider, MD   traMADol (ULTRAM) 50 MG tablet Take 50 mg by mouth every 8 hours as needed. Historical Provider, MD        Allergies:     Patient has no known allergies.     Social History:     Tobacco:    has no history on file for tobacco use. Alcohol:      has no history on file for alcohol use. Drug Use:  has no history on file for drug use. Family History:     History reviewed. No pertinent family history. Review of Systems:     Review of Systems      Physical Exam:   BP (!) 151/96   Pulse 93   Temp 99.1 °F (37.3 °C) (Oral)   Resp 16   Ht 6' (1.829 m)   Wt 300 lb (136.1 kg)   SpO2 96%   BMI 40.69 kg/m²   Temp (24hrs), Av.1 °F (37.3 °C), Min:99.1 °F (37.3 °C), Max:99.1 °F (37.3 °C)    Recent Labs     22  1138   POCGLU 94     No intake or output data in the 24 hours ending 22 1949      Neurologic Exam    CONSTITUTIONAL:  Well developed, well nourished, alert and oriented x 3, in no acute distress. GCS 15, nontoxic. No dysarthria, aphasia present. HEAD:  normocephalic, atraumatic    EYES:  PERRLA, EOMI.   ENT:  moist mucous membranes   NECK:  supple, symmetric, no midline tenderness to palpation    BACK:  without midline tenderness, step-offs or deformities    LUNGS:  Equal air entry bilaterally   CARDIOVASCULAR:  normal s1 / s2   ABDOMEN:  Soft, no rigidity   NEUROLOGIC:  Mental Status:  A & O x3,awake             Cranial Nerves:    VII: Facial strength: abnormal      Motor Exam:    Drift:  absent  Tone:  normal     Motor exam is symmetrical 5 out of 5 all extremities bilaterally     Sensory:    Touch:    Right Upper Extremity:  normal  Left Upper Extremity:  normal  Right Lower Extremity:  normal  Left Lower Extremity:  normal     Deep Tendon Reflexes:    Right Bicep:  2+  Left Bicep:  2+  Right Knee:  2+  Left Knee:  2+     Plantar Response:  Right:  downgoing  Left:  downgoing     Clonus:  absent  Brewer's:  absent     Coordination/Dysmetria:  Heel to Shin:  Right:  normal  Left:  normal  Finger to Nose:   Right:  normal  Left:  normal   Dysdiadochokinesia:  N/A     Gait: Not examined     INITIAL NIH STROKE SCALE:     Time Performed:  7414 AM      1a.   Level of consciousness:  0 - alert; keenly responsive  1b. Level of consciousness questions:  0 - answers both questions correctly  1c. Level of consciousness questions:  0 - performs both tasks correctly  2. Best Gaze:  0 - normal  3. Visual:  0 - no visual loss  4. Facial Palsy:  1 - minor paralysis (flattened nasolabial fold, asymmetric on smiling)  5a. Motor left arm:  0 - no drift, limb holds 90 (or 45) degrees for full 10 seconds  5b. Motor right arm:  0 - no drift, limb holds 90 (or 45) degrees for full 10 seconds  6a. Motor left le - no drift; leg holds 30 degree position for full 5 seconds  6b. Motor right le - no drift; leg holds 30 degree position for full 5 seconds  7. Limb Ataxia:  0 - absent  8. Sensory:  0 - normal; no sensory loss  9. Best Language:  2 - severe aphasia; all communication is through fragmentary expression; great need for inference, questioning, and guessing by the listener. Range of information that can be exchanged is limited; listener carries burden of communication. Examiner cannot identify materials provided from patient response. 10.  Dysarthria:  0 - normal  11.   Extinction and Inattention:  0 - no abnormality     TOTAL:  3      SKIN:  no rash          Investigations:      Laboratory Testing:  Recent Results (from the past 24 hour(s))   Venous Blood Gas, POC    Collection Time: 22 11:38 AM   Result Value Ref Range    pH, Sal 7.461 (H) 7.320 - 7.430    pCO2, Sal 34.6 (L) 41.0 - 51.0 mm Hg    pO2, Sal 47.0 30.0 - 50.0 mm Hg    HCO3, Venous 24.7 22.0 - 29.0 mmol/L    Positive Base Excess, Sal 1 0.0 - 3.0    O2 Sat, Sal 85 60.0 - 85.0 %   ELECTROLYTES PLUS    Collection Time: 22 11:38 AM   Result Value Ref Range    POC Sodium 144 138 - 146 mmol/L    POC Potassium 3.7 3.5 - 4.5 mmol/L    POC Chloride 107 98 - 107 mmol/L    POC TCO2 24 22 - 30 mmol/L    Anion Gap 14 7 - 16 mmol/L   Hemoglobin and hematocrit, blood    Collection Time: 22 11:38 AM   Result Value Ref Range POC Hemoglobin 16.2 13.5 - 17.5 g/dL    POC Hematocrit 47 41 - 53 %   Creatinine W/GFR Point of Care    Collection Time: 12/01/22 11:38 AM   Result Value Ref Range    POC Creatinine 1.07 0.51 - 1.19 mg/dL    eGFR, POC >60 mL/min/1.73m2   CALCIUM, IONIC (POC)    Collection Time: 12/01/22 11:38 AM   Result Value Ref Range    POC Ionized Calcium 1.20 1. 15 - 1.33 mmol/L   POCT urea (BUN)    Collection Time: 12/01/22 11:38 AM   Result Value Ref Range    POC BUN 20 8 - 26 mg/dL   Lactic Acid, POC    Collection Time: 12/01/22 11:38 AM   Result Value Ref Range    POC Lactic Acid 1.54 (H) 0.56 - 1.39 mmol/L   POCT Glucose    Collection Time: 12/01/22 11:38 AM   Result Value Ref Range    POC Glucose 94 74 - 100 mg/dL   STROKE PANEL    Collection Time: 12/01/22 11:45 AM   Result Value Ref Range    Glucose 93 70 - 99 mg/dL    BUN 19 6 - 20 mg/dL    Creatinine 0.89 0.70 - 1.20 mg/dL    Est, Glom Filt Rate >60 >60 mL/min/1.73m2    Calcium 9.4 8.6 - 10.4 mg/dL    Sodium 138 135 - 144 mmol/L    Potassium 4.0 3.7 - 5.3 mmol/L    Chloride 104 98 - 107 mmol/L    CO2 21 20 - 31 mmol/L    Anion Gap 13 9 - 17 mmol/L    WBC 7.1 3.5 - 11.3 k/uL    RBC 5.13 4.21 - 5.77 m/uL    Hemoglobin 15.6 13.0 - 17.0 g/dL    Hematocrit 46.0 40.7 - 50.3 %    MCV 89.7 82.6 - 102.9 fL    MCH 30.4 25.2 - 33.5 pg    MCHC 33.9 28.4 - 34.8 g/dL    RDW 13.1 11.8 - 14.4 %    Platelets 952 739 - 921 k/uL    MPV 11.9 8.1 - 13.5 fL    NRBC Automated 0.0 0.0 per 100 WBC    Total  39 - 308 U/L    Seg Neutrophils 56 36 - 65 %    Lymphocytes 32 24 - 43 %    Monocytes 9 3 - 12 %    Eosinophils % 2 1 - 4 %    Basophils 1 0 - 2 %    Immature Granulocytes 0 0 %    Segs Absolute 3.99 1.50 - 8.10 k/uL    Absolute Lymph # 2.25 1.10 - 3.70 k/uL    Absolute Mono # 0.64 0.10 - 1.20 k/uL    Absolute Eos # 0.17 0.00 - 0.44 k/uL    Basophils Absolute 0.05 0.00 - 0.20 k/uL    Absolute Immature Granulocyte <0.03 0.00 - 0.30 k/uL    Myoglobin 51 28 - 72 ng/mL    Protime 10.4 9.1 - 12.3 sec    INR 1.0     PTT 22.2 20.5 - 30.5 sec    Troponin, High Sensitivity 16 0 - 22 ng/L         Assessment :      Primary Problem  Ischemic stroke [facility])    Active Hospital Problems    Diagnosis Date Noted    Ischemic stroke ([facility] [address] [I63.9] 12/01/2022     Priority: Medium    Hypertensive emergency [I16.1] 12/01/2022     Priority: Medium                   62 y.o. male who presents with history of cryptogenic stroke October 2022, hypertension, presented with difficulty speaking unknown duration, was at a facility for follow-up and requested 911. Initial blood pressure 170s    Plan:     Patient status Admit as inpatient in the  Progressive Unit/Step down    Subacute ischemic stroke  -CT head showing old lacunar infarct on left side  -CTA head and neck multifocal mild intracranial stenosis  -MRI brain without contrast subacute left corpus callosal infarct  -Continue aspirin 81 mg  -Continue Lipitor 40 mg  -Hemoglobin A1c in October was 5.5,   -Start Plavix 75 mg    Hypertension  -Consult internal medicine  -Blood pressure goal normotensive after MRI showing subacute infarct  -Labetalol 10 mg as needed  -Continue Norvasc 10 mg  -Hydrochlorothiazide 25 mg  -Lisinopril 40 mg      Consultations:   IP CONSULT TO INTERNAL MEDICINE      Follow-up further recommendations after discussing the case with attending  The plan was discussed with the patient, patient's family and the medical staff. Patient is admitted as inpatient status because of co-morbidities listed above, severity of signs and symptoms as outlined, requirement for current medical therapies and most importantly because of direct risk to patient if care not provided in a hospital setting. Marina Regalado MD  Neurology Resident PGY-3  12/1/2022  7:49 PM    Copy sent to Dr. Vargas primary care provider on file.

## 2022-12-02 NOTE — PROGRESS NOTES
Occupational Therapy  Facility/Department: [address] NEURO ICU  Occupational Therapy Initial Assessment    Name: Kevin Perales  : 1965  MRN: [id number]  Date of Service: 2022    Discharge Recommendations:  Further therapy recommended at discharge. The patient should be able to tolerate at least 3 hours of therapy per day over 5 days or 15 hours over 7 days. This patient may benefit from a Physical Medicine and Rehab consult. OT Equipment Recommendations  Equipment Needed: Yes  Mobility Devices: ADL Assistive Devices  ADL Assistive Devices: Toileting - 3-in-1 Commode; Shower Chair with back       Patient Diagnosis(es): The encounter diagnosis was Cerebrovascular accident (CVA), unspecified mechanism ([facility] [address] Past Medical History:  has a past medical history of HTN (hypertension) and Obesity. Past Surgical History:  has a past surgical history that includes Nerve Block (2013); Nerve Block (9/10/13); and Nerve Block (3/28/14). Assessment   Performance deficits / Impairments: Decreased functional mobility ; Decreased ADL status; Decreased endurance;Decreased high-level IADLs;Decreased cognition;Decreased safe awareness;Decreased balance  Assessment: Pt limited throughout session by decreased balance, endurance and cognition. Demonstrated functional sit<>stand transfers and functional mobility wtih CGA and use of RW. Engaged in grooming task standing sinkisde with Mod IND for task and CGA for dynamic standing balance. Donned/doffed sock with modified figure four tech. Pt is expected to require skilled OT services during their acute hospitalization stay to address the above noted deficits through skilled occupational therapy intervention for promotion of increased independence throughout ADLs, IADLs and functional mobility tasks.    Prognosis: Good  Decision Making: Medium Complexity  REQUIRES OT FOLLOW-UP: Yes  Activity Tolerance  Activity Tolerance: Patient Tolerated treatment well        Plan Occupational Therapy Plan  Times Per Week: 3-5x/wk  Current Treatment Recommendations: Functional mobility training, Endurance training, Equipment evaluation, education, & procurement, Patient/Caregiver education & training, Self-Care / ADL, Home management training, Safety education & training     Restrictions  Restrictions/Precautions  Restrictions/Precautions: Up as Tolerated, Fall Risk  Required Braces or Orthoses?: No  Position Activity Restriction  Other position/activity restrictions: aphasia    Subjective   General  Patient assessed for rehabilitation services?: Yes  Family / Caregiver Present: No  General Comment  Comments: RN ok'd for OT/PT eval this AM. Pt agreeable to session, pleasent/cooperative throughout. P caylaports 5/10 pain to B knees, R worse than L per report. RN in room upon arrival and aware of pain. Admistered ice for comfort. Social/Functional History  Social/Functional History  Lives With: Significant other  Type of Home: Apartment  Home Layout: One level  Home Access: Level entry  Bathroom Shower/Tub: Tub/Shower unit  Bathroom Toilet: Standard  Bathroom Equipment: Grab bars in shower  Bathroom Accessibility: Not accessible  Home Equipment: [address] (pt reports ambulating with rollator at baseline)  Receives Help From: Family  ADL Assistance: [address]: Independent  Homemaking Responsibilities: Yes  Ambulation Assistance: Independent  Transfer Assistance: Independent  Active : Yes  Mode of Transportation: Truck  Occupation: Retired  Leisure & Hobbies: Enjoys driving truck  Additional Comments: Pt reports significant other works, otherwise able to assist.       Objective      Safety Devices  Type of Devices: Gait belt;Nurse notified;Call light within reach; Left in chair;Chair alarm in place  Restraints  Restraints Initially in Place: No    Balance  Sitting: Intact (Pt seated EOB unsupported statically/dynamically for functional tasks and ROM/MMT for ~7 minutes with SUP.)  Standing: With support (Pt stood statically and at sink for ADLs ~4 minutes. Pt able to release BUEs off walker for grooming task sinkside. Intermittently used sink for support with 1 UE. No LOB and use of RW.)    Gait  Overall Level of Assistance: Contact-guard assistance (Pt completed functional mobility around room and to/from bathroom with CGA and use of RW. Pt exhibits unsteadiness d/t B knee pain. Difficulty placing R heel on floor when taking steps.)     AROM: Within functional limits  Strength: Within functional limits (BUE 5/5)  Coordination: Within functional limits  Tone: Normal  Sensation: Intact (Denies any numbness/tingling)    ADL  Feeding: Independent  Grooming: Modified independent ;Setup; Increased time to complete  Grooming Skilled Clinical Factors: Pt stood sinkside and completed oral hygiene w/ setup. UE Bathing: Stand by assistance;Verbal cueing; Increased time to complete;Setup  LE Bathing: Minimal assistance;Verbal cueing;Setup; Increased time to complete  UE Dressing: Stand by assistance; Increased time to complete  LE Dressing: Verbal cueing;Setup; Increased time to complete;Contact guard assistance  LE Dressing Skilled Clinical Factors: Pt able to pepper/doff R sock with Mod IND seated EOB using modified figure four tech. Expected to require increased balance support for standing LB dressing tasks. Toileting: Contact guard assistance;Setup; Increased time to complete     Activity Tolerance  Activity Tolerance: Patient limited by endurance    Bed mobility  Bed Mobility Comments: Pt seated EOB w/ RN upon arrival and retired to bedside chair upon conclusion of session. Transfers  Sit to stand: Contact guard assistance  Stand to sit: Contact guard assistance  Transfer Comments: W/ RW and min VCs for safety/hand placement.     Vision  Vision: Impaired (pt reports acute increased blurred vision)  Hearing  Hearing: Within functional limits    Cognition  Overall Cognitive Status: Exceptions  Arousal/Alertness: Appropriate responses to stimuli  Following Commands: Follows multistep commands with repitition  Attention Span: Attends with cues to redirect  Safety Judgement: Decreased awareness of need for assistance  Problem Solving: Assistance required to generate solutions;Assistance required to correct errors made  Insights: Decreased awareness of deficits  Initiation: Requires cues for some  Sequencing: Requires cues for some    Orientation  Overall Orientation Status: Impaired  Orientation Level: Disoriented to place; Disoriented to time;Oriented to person        Education Provided Comments: Pt educated on OT role, OT POC, activity promotion, transfer training, safety awareness, positioning to reduce pain-good to fair return d/t cognition    LUE AROM (degrees)  LUE AROM : WFL  Left Hand AROM (degrees)  Left Hand AROM: WFL  RUE AROM (degrees)  RUE AROM : WFL  Right Hand AROM (degrees)  Right Hand AROM: WFL           AM-PAC Score        AM-PAC Inpatient Daily Activity Raw Score: 20 (12/02/22 1318)  AM-PAC Inpatient ADL T-Scale Score : 42.03 (12/02/22 1318)  ADL Inpatient CMS 0-100% Score: 38.32 (12/02/22 1318)  ADL Inpatient CMS G-Code Modifier : CJ (12/02/22 1318)    Goals  Short Term Goals  Time Frame for Short Term Goals: By discharge, pt will:  Short Term Goal 1: Demo functional sit<>stand transfers and functional mobility with Mod IND and LRD PRN  Short Term Goal 2: Demo 10 minutes of dynamic standing balance, reaching in multiple planes, with SBA to promote increased engagement in ADLs  Short Term Goal 3: Demo UB ADLs with Mod IND  Short Term Goal 4: Demo LB ADLs/toileting with SUP and use of DME PRN  Short Term Goal 5: Follow 2 step commands with 90% accuracy and 0 VCs to address cognition for increased independence throughout ADLs  Short Term Goal 6: Demo good safety awareness indpendently throughout all functional activities with <2 VCs each visit       Therapy Time Individual Concurrent Group Co-treatment   Time In 1021         Time Out 1054         Minutes 33         Timed Code Treatment Minutes: 12 Minutes       Susie Mcdonald, OTR/L

## 2022-12-02 NOTE — PROGRESS NOTES
Speech Language Pathology  Facility/Department: [address] NEURO ICU  Initial Speech/Language/Cognitive Assessment    NAME: Joy Matamoros  : 1965   MRN: [id number]  ADMISSION DATE: 2022  ADMITTING DIAGNOSIS: has HTN (hypertension); Obesity; Spinal stenosis, lumbar region, without neurogenic claudication; Ischemic stroke ([facility] [address] Hypertensive emergency; Hypertensive urgency; and Dysarthria due to recent stroke on their problem list.      Date of Eval: 2022   Evaluating Therapist: LARISSA Apple    RECENT RESULTS  CT OF HEAD/MRI:   Impression   Small subacute infarct in the left anterior corpus callosum. Small chronic infarcts and microvascular disease likely representing chronic   hypertensive encephalopathy. Primary Complaint: The patient is a 62 y.o. male past medical history of HTN, obesity cryptogenic stroke 10/2022 possible TAL, who presents with difficulty speaking. Last know well: unknown. Patient currently is in a facility for rehab after his recent stroke, he had a follow-up appointment, while at the facility he became more aphasic and requested to be sent to ED. During that time patient was not complaining of any weakness, any headache or blurry vision. Denies any nausea or vomiting. His mother states that he was having difficulty speaking due to previous stroke. His son states that the patient does not seem like himself, unable to describe what has changed. Patient lives with his girlfriend, unable to get hold of her. On presentation: BP was 172/141, and BSL: 94. Prior to arrival patient was on  Aspirin 81, patient completed a course of Plavix 2 weeks, Lipitor 40 mg.   Smoking history: smoker    CT head was done Showed a remote lacunar infarct of the left basal ganglia, no acute abnormalities.   CTA head and neck showing mild stenosis at the origin of bilateral vertebral arteries, 40% stenosis of the right ICA origin, multifocal mild stenosis of the basilar artery. In October was seen in [facility] for high blood pressure apparently patient was unable to take his medication, was seen at [address] [place] [address] around 10/8/2022 for hypertensive emergency. Apparently patient was having dysarthria and seemed like a chronic issue known to the patientat that time patient had an MRI brain without contrast showing an acute ischemia in the left subinsular region and right cerebellum. Hemoglobin A1c 5.5, . Echo was negative for bubble study, MRA head and neck were negative for carotid stenosis. MRI abdomen as work-up for renal artery stenosis was normal.  Patient was discharged on dual antiplatelet therapy, Plavix for 2 weeks. Patient was on permissive hypertension until MRI brain wo showed Small subacute infarct in the left anterior corpus callosum. Patient was admitted to neurology service for monitoring and blood pressure management. Pain:  Pain Assessment  Pain Assessment: None - Denies Pain         Assessment:    Pt presents with moderate expressive and mild - moderate receptive language deficits characterized by impaired verbalize biographical information, complete responsive naming tasks, generate items in a category, accurately respond to complex yes/no questions and follow 3 step commands. Pt. With word retrieval difficulties and paraphasic errors noted throughout session. ? Apraxia of speech characteristics. Pt. Aware of deficits and interested in Speech therapy. Pt with no dysarthria noted. ST to follow up and provide treatment to address noted deficits. Education provided.         Recommendations:  Recommendations  Requires SLP Intervention: Yes  Patient Education: yes  Patient Education Response: Verbalizes understanding         Goals:  Short Term Goals  Goal 1: Verbalize bio information/wants and needs with 90% acccuracy  Goal 2: Follow 3 step commands with 90% accuracy  Goal 3: Establish a reliable yes/no response with complex yes/no questions with 90% accuracy  Goal 4: Complete responsive naming tasks with 90% accuracy  Goal 5: Generated 4-5 members of a concrete category with 90% accuracy  Goal 6: Provide comp.  Strategies for word retrieval.  Patient/family involved in developing goals and treatment plan: yes    Subjective:   Previous level of function and limitations: independent     Objective:       Oral Motor   Labial: No impairment  Lingual: No impairment    Motor Speech  Apraxic Characteristics: mild-moderate  Intelligibility: No impairment  Overall Impairment Severity: None    Auditory Comprehension  Comprehension: Exceptions  One Step Commands: (4/4)  Two Step Commands: (3/3)  3 step: 0/2  Common Objects: WFL  Conversation: Mild-moderate         Expression  Primary Mode of Expression: Verbal    Verbal Expression  Verbal Expression: Exceptions to functional limits  Repetition: (3/3)  Automatic Speech: (3/3)  Confrontation:  (3/4)  Divergent: (3/30 seconds)  Conversation: moderate  Interfering Components: Impaired thought organization;Perseverations      Prognosis:   fair    Education:  Patient Education: yes  Patient Education Response: Verbalizes understanding          Therapy Time:   Individual Concurrent Group Co-treatment   Time In  955         Time Out  1010         Minutes  15                 Electronically signed by LARISSA Ashley on 12/2/2022 at 10:56 AM

## 2022-12-02 NOTE — PROGRESS NOTES
[id number] [facility] Neurology   [address] [place] [address]    Progress Note             Date:   12/2/2022  Patient name:  Orquidea Cornejo  Date of admission:  12/1/2022 11:28 AM  MRN:   [id number]  Account:  [de-identified]  YOB: 1965  PCP:    No primary care provider on file. Room:   [address] [place] [address]  Code Status:    Full Code    Chief Complaint:     Chief Complaint   Patient presents with    Altered Mental Status    Aphasia              Interval hx: The patient was seen and examined at bedside. Is vitally stable, alert and oriented x 3. No acute events overnight. Received labetalol twice overnight  Pressure readings constantly high, internal medicine following for HTN  Started on Plavix 75mg daily   for living issues     Brief History of Present Illness: The patient is a 62 y.o. male past medical history of HTN, obesity cryptogenic stroke 10/2022 possible TAL, who presents with difficulty speaking. Last know well: unknown. Patient currently is in a facility for rehab after his recent stroke, he had a follow-up appointment, while at the facility he became more aphasic and requested to be sent to ED. During that time patient was not complaining of any weakness, any headache or blurry vision. Denies any nausea or vomiting. His mother states that he was having difficulty speaking due to previous stroke. His son states that the patient does not seem like himself, unable to describe what has changed. Patient lives with his girlfriend, unable to get hold of her. On presentation: BP was 172/141, and BSL: 94. Prior to arrival patient was on  Aspirin 81, patient completed a course of Plavix 2 weeks, Lipitor 40 mg.   Smoking history: smoker    CT head was done Showed a remote lacunar infarct of the left basal ganglia, no acute abnormalities.   CTA head and neck showing mild stenosis at the origin of bilateral vertebral arteries, 40% stenosis of the right ICA origin, multifocal mild stenosis of the basilar artery. In October was seen in [facility] for high blood pressure apparently patient was unable to take his medication, was seen at [address] [place] [address] around 10/8/2022 for hypertensive emergency. Apparently patient was having dysarthria and seemed like a chronic issue known to the patientat that time patient had an MRI brain without contrast showing an acute ischemia in the left subinsular region and right cerebellum. Hemoglobin A1c 5.5, . Echo was negative for bubble study, MRA head and neck were negative for carotid stenosis. MRI abdomen as work-up for renal artery stenosis was normal.  Patient was discharged on dual antiplatelet therapy, Plavix for 2 weeks. Patient was on permissive hypertension until MRI brain wo showed Small subacute infarct in the left anterior corpus callosum. Patient was admitted to neurology service for monitoring and blood pressure management. Past Medical History:     Past Medical History:   Diagnosis Date    HTN (hypertension)     Obesity         Past Surgical History:     Past Surgical History:   Procedure Laterality Date    NERVE BLOCK  08/05/2013    CAUDAL #1- DECADRON 10 MG. NERVE BLOCK  9/10/13    caudal #2 decadron 7.5 mg    NERVE BLOCK  3/28/14    duramorph 1.5mg  celestone 9mg        Medications Prior to Admission:     Prior to Admission medications    Medication Sig Start Date End Date Taking? Authorizing Provider   acetaminophen-codeine (TYLENOL/CODEINE #3) 300-30 MG per tablet Take 1 tablet by mouth every 8 hours as needed for Pain. Historical Provider, MD   amLODIPine (NORVASC) 10 MG tablet Take 10 mg by mouth daily. Historical Provider, MD   hydrochlorothiazide (HYDRODIURIL) 25 MG tablet Take 25 mg by mouth daily. Historical Provider, MD   lisinopril (PRINIVIL;ZESTRIL) 40 MG tablet Take 40 mg by mouth daily. Historical Provider, MD   nebivolol (BYSTOLIC) 5 MG tablet Take 5 mg by mouth daily.     Historical Provider, MD   traMADol (ULTRAM) 50 MG tablet Take 50 mg by mouth every 8 hours as needed. Historical Provider, MD        Allergies:     Patient has no known allergies. Social History:     Tobacco:    has no history on file for tobacco use. Alcohol:      has no history on file for alcohol use. Drug Use:  has no history on file for drug use. Family History:     History reviewed. No pertinent family history. Review of Systems:     Review of Systems    Physical Exam:   BP (!) 152/98   Pulse 61   Temp 98.9 °F (37.2 °C) (Oral)   Resp 13   Ht 6' (1.829 m)   Wt (!) 302 lb 1.6 oz (137 kg)   SpO2 98%   BMI 40.97 kg/m²   Temp (24hrs), Av °F (37.2 °C), Min:98.9 °F (37.2 °C), Max:99.1 °F (37.3 °C)    Recent Labs     22  1138   POCGLU 94       Intake/Output Summary (Last 24 hours) at 2022 0739  Last data filed at 2022 0100  Gross per 24 hour   Intake --   Output 400 ml   Net -400 ml         Neurologic Exam     CONSTITUTIONAL:  Well developed, well nourished, alert and oriented x 3, in no acute distress. GCS 15, nontoxic. Dysarthria present, aphasia present.    HEAD:  normocephalic, atraumatic    EYES:  PERRLA, EOMI.   ENT:  moist mucous membranes   NECK:  supple, symmetric, no midline tenderness to palpation    BACK:  without midline tenderness, step-offs or deformities    LUNGS:  Equal air entry bilaterally   CARDIOVASCULAR:  normal s1 / s2   ABDOMEN:  Soft, no rigidity   NEUROLOGIC:  Mental Status:  A & O x3,awake             Cranial Nerves:    VII: Facial strength: abnormal      Motor Exam:    Drift:  absent  Tone:  normal     Motor exam is symmetrical 5 out of 5 all extremities bilaterally     Sensory:    Touch:    Right Upper Extremity:  normal  Left Upper Extremity:  normal  Right Lower Extremity:  normal  Left Lower Extremity:  normal     Deep Tendon Reflexes:    Right Bicep:  2+  Left Bicep:  2+  Right Knee:  2+  Left Knee:  2+     Plantar Response:  Right:  downgoing  Left: downgoing     Clonus:  absent  Brewer's:  absent     Coordination/Dysmetria:  Heel to Shin:  Right:  normal  Left:  normal  Finger to Nose:   Right:  normal  Left:  normal   Dysdiadochokinesia:  N/A     Gait: Not examined       Investigations:      Laboratory Testing:  Recent Results (from the past 24 hour(s))   Venous Blood Gas, POC    Collection Time: 12/01/22 11:38 AM   Result Value Ref Range    pH, Sal 7.461 (H) 7.320 - 7.430    pCO2, Sal 34.6 (L) 41.0 - 51.0 mm Hg    pO2, Sal 47.0 30.0 - 50.0 mm Hg    HCO3, Venous 24.7 22.0 - 29.0 mmol/L    Positive Base Excess, Sal 1 0.0 - 3.0    O2 Sat, Sal 85 60.0 - 85.0 %   ELECTROLYTES PLUS    Collection Time: 12/01/22 11:38 AM   Result Value Ref Range    POC Sodium 144 138 - 146 mmol/L    POC Potassium 3.7 3.5 - 4.5 mmol/L    POC Chloride 107 98 - 107 mmol/L    POC TCO2 24 22 - 30 mmol/L    Anion Gap 14 7 - 16 mmol/L   Hemoglobin and hematocrit, blood    Collection Time: 12/01/22 11:38 AM   Result Value Ref Range    POC Hemoglobin 16.2 13.5 - 17.5 g/dL    POC Hematocrit 47 41 - 53 %   Creatinine W/GFR Point of Care    Collection Time: 12/01/22 11:38 AM   Result Value Ref Range    POC Creatinine 1.07 0.51 - 1.19 mg/dL    eGFR, POC >60 mL/min/1.73m2   CALCIUM, IONIC (POC)    Collection Time: 12/01/22 11:38 AM   Result Value Ref Range    POC Ionized Calcium 1.20 1. 15 - 1.33 mmol/L   POCT urea (BUN)    Collection Time: 12/01/22 11:38 AM   Result Value Ref Range    POC BUN 20 8 - 26 mg/dL   Lactic Acid, POC    Collection Time: 12/01/22 11:38 AM   Result Value Ref Range    POC Lactic Acid 1.54 (H) 0.56 - 1.39 mmol/L   POCT Glucose    Collection Time: 12/01/22 11:38 AM   Result Value Ref Range    POC Glucose 94 74 - 100 mg/dL   STROKE PANEL    Collection Time: 12/01/22 11:45 AM   Result Value Ref Range    Glucose 93 70 - 99 mg/dL    BUN 19 6 - 20 mg/dL    Creatinine 0.89 0.70 - 1.20 mg/dL    Est, Glom Filt Rate >60 >60 mL/min/1.73m2    Calcium 9.4 8.6 - 10.4 mg/dL    Sodium 138 135 - 144 mmol/L    Potassium 4.0 3.7 - 5.3 mmol/L    Chloride 104 98 - 107 mmol/L    CO2 21 20 - 31 mmol/L    Anion Gap 13 9 - 17 mmol/L    WBC 7.1 3.5 - 11.3 k/uL    RBC 5.13 4.21 - 5.77 m/uL    Hemoglobin 15.6 13.0 - 17.0 g/dL    Hematocrit 46.0 40.7 - 50.3 %    MCV 89.7 82.6 - 102.9 fL    MCH 30.4 25.2 - 33.5 pg    MCHC 33.9 28.4 - 34.8 g/dL    RDW 13.1 11.8 - 14.4 %    Platelets 058 635 - 138 k/uL    MPV 11.9 8.1 - 13.5 fL    NRBC Automated 0.0 0.0 per 100 WBC    Total  39 - 308 U/L    Seg Neutrophils 56 36 - 65 %    Lymphocytes 32 24 - 43 %    Monocytes 9 3 - 12 %    Eosinophils % 2 1 - 4 %    Basophils 1 0 - 2 %    Immature Granulocytes 0 0 %    Segs Absolute 3.99 1.50 - 8.10 k/uL    Absolute Lymph # 2.25 1.10 - 3.70 k/uL    Absolute Mono # 0.64 0.10 - 1.20 k/uL    Absolute Eos # 0.17 0.00 - 0.44 k/uL    Basophils Absolute 0.05 0.00 - 0.20 k/uL    Absolute Immature Granulocyte <0.03 0.00 - 0.30 k/uL    Myoglobin 51 28 - 72 ng/mL    Protime 10.4 9.1 - 12.3 sec    INR 1.0     PTT 22.2 20.5 - 30.5 sec    Troponin, High Sensitivity 16 0 - 22 ng/L   CBC    Collection Time: 12/02/22  4:16 AM   Result Value Ref Range    WBC 6.6 3.5 - 11.3 k/uL    RBC 4.46 4.21 - 5.77 m/uL    Hemoglobin 13.2 13.0 - 17.0 g/dL    Hematocrit 41.7 40.7 - 50.3 %    MCV 93.5 82.6 - 102.9 fL    MCH 29.6 25.2 - 33.5 pg    MCHC 31.7 28.4 - 34.8 g/dL    RDW 13.2 11.8 - 14.4 %    Platelets 792 856 - 572 k/uL    MPV 11.4 8.1 - 13.5 fL    NRBC Automated 0.0 0.0 per 100 WBC     Recent Labs     12/02/22  0416   WBC 6.6   RBC 4.46   HGB 13.2   HCT 41.7   MCV 93.5   MCH 29.6   MCHC 31.7   RDW 13.2      MPV 11.4     Recent Labs     12/01/22  1145      K 4.0      CO2 21   BUN 19   CREATININE 0.89   GLUCOSE 93   CALCIUM 9.4     No results found for: LABA1C    Assessment :      Primary Problem  Ischemic stroke [facility])    Active Hospital Problems    Diagnosis Date Noted    Ischemic stroke ([facility] [address] [I63.9] 12/01/2022     Priority: Medium    Hypertensive emergency [I16.1] 12/01/2022     Priority: Medium    Hypertensive urgency [I16.0] 12/01/2022     Priority: Medium    Dysarthria due to recent stroke [I69.322] 12/01/2022     Priority: Medium        62 y.o. male who presents with history of cryptogenic stroke October 2022, hypertension, presented with difficulty speaking unknown duration, was at a facility for follow-up and requested 911. Initial blood pressure 170s    Plan:     Subacute ischemic stroke  -CT head showing old lacunar infarct on left side  -CTA head and neck multifocal mild intracranial stenosis  -MRI brain without contrast subacute left corpus callosal infarct  -Continue aspirin 81 mg  -Continue Lipitor 40 mg  -Hemoglobin A1c in October was 5.5,   -Start Plavix 75 mg  - PT OT eval - recommends acute rehabe  - PM&R consult   - Case management - home health care to help patient with medication compliance      Hypertension  -Consult internal medicine  -Blood pressure goal normotensive after MRI showing subacute infarct  -Labetalol 10 mg as needed  -Continue Norvasc 10 mg  -Hydrochlorothiazide 25 mg  -Lisinopril 40 mg           Follow-up further recommendations after discussing the case with attending  The plan was discussed with the patient, patient's family and the medical staff. Consultations:   IP CONSULT TO INTERNAL MEDICINE    Patient is admitted as inpatient status because of co-morbidities listed above, severity of signs and symptoms as outlined, requirement for current medical therapies and most importantly because of direct risk to patient if care not provided in a hospital setting. Trini Mcgill MD  Neurology Resident PGY-2  12/2/2022  7:39 AM    Copy sent to Dr. Vargas primary care provider on file.

## 2022-12-02 NOTE — CARE COORDINATION
12/02/22 0802   Service Assessment   Patient Orientation Alert and Oriented   Cognition Alert   History Provided By Patient  (mother at bedside assisting with questions)   Primary Caregiver Self   Accompanied By/Relationship mother at [address] Parent   PCP Verified by CM Yes  (does not know who pcp is but relates has been seen in past 3 months')   Last Visit to PCP Within last 3 months   Prior Functional Level Independent in ADLs/IADLs   Current Functional Level Independent in ADLs/IADLs   Can patient return to prior living arrangement Unknown at present  (lives w girlfrend, may have PMR needs)   Ability to make needs known: Fair   Family able to assist with home care needs: Yes   Would you like for me to discuss the discharge plan with any other family members/significant others, and if so, who? Yes  (mother at bedside)   Financial Resources Medicare   Social/Functional History   Lives With Significant other   Type of [address] One level   Home Access Level entry   Bathroom Shower/Tub Tub/Shower unit   [address] bars in shower   Bathroom Accessibility Not accessible   Home Equipment Cane;Walker, rolling; [address] Help From Family   ADL Assistance Independent   Homemaking Assistance Independent   Homemaking Responsibilities Yes   Ambulation Assistance Independent  (mother relates pt normally has bad knees)   Transfer Assistance Independent   Active  Yes   Mode of Transportation Truck   Occupation On disability   Discharge Planning   Type of Residence Apartment;Acute Rehab   Living Arrangements   (lives w girlfriend)   Potential Assistance Needed N/A   DME Ordered?  No   Potential Assistance Purchasing Medications Yes  (will need meds to beds, does not know what pharmacy he uses)   Type of [facility]  (states current with [facility] [place]   Patient expects to be discharged to: [facility] One/Two Story Residence One story   History of falls? 0   Services At/After Discharge   Transition of Care Consult (CM Consult)   (potential IP Rehab needs)   Mode of Transport at Discharge   (girlfriend to provide transp home if discharges home vs IP Rehab)   Confirm Follow Up Transport Family   Condition of Participation: Discharge Planning   The Plan for Transition of Care is related to the following treatment goals: comfort   The Patient and/or Patient Representative was provided with a Choice of Provider?   (will need choices after PT/OT evals if approp for rehab)   [place] of Choice list was provided with basic dialogue that supports the patient's individualized plan of care/goals, treatment preferences, and shares the quality data associated with the providers? No   Case Management Assessment  Initial Evaluation    Date/Time of Evaluation: 12/2/2022 8:08 AM  Assessment Completed by: Gail Wilson RN    If patient is discharged prior to next notation, then this note serves as note for discharge by case management. Patient Name: Balbina Lobo                   YOB: 1965  Diagnosis: Ischemic stroke [facility]) [I63.9]  Cerebrovascular accident (CVA), unspecified mechanism ([facility] [address] [I63.9]                   Date / Time: 12/1/2022 11:28 AM    Patient Admission Status: Inpatient   Readmission Risk (Low < 19, Mod (19-27), High > 27): Readmission Risk Score: 15.8    Current PCP: No primary care provider on file. PCP verified by CM? (P) Yes (does not know who pcp is but relates has been seen in past 3 months')    Chart Reviewed: Yes      History Provided by: (P) Patient (mother at bedside assisting with questions)  Patient Orientation: (P) Alert and Oriented    Patient Cognition: (P) Alert    Hospitalization in the last 30 days (Readmission):  No    If yes, Readmission Assessment in CM Navigator will be completed.     Advance Directives:      Code Status: Full Code   Patient's Primary Decision Maker is:        Discharge Planning:    Patient lives with: (P)  (lives w girlfriend) Type of Home: (P) Apartment, Acute Rehab  Primary Care Giver: (P) Self  Patient Support Systems include: (P) Parent   Current Financial resources: (P) Medicare  Current community resources:    Current services prior to admission:              Current DME:              Type of Home Care services:  (P) Nursing Services (states current with [facility])    ADLS  Prior functional level: (P) Independent in ADLs/IADLs  Current functional level: (P) Independent in ADLs/IADLs    PT AM-PAC:   /24  OT AM-PAC:   /24    Family can provide assistance at DC: (P) Yes  Would you like Case Management to discuss the discharge plan with any other family members/significant others, and if so, who? (P) Yes (mother at bedside)  Plans to Return to Present Housing: (P) Unknown at present (lives w girlfrend, may have PMR needs)  Other Identified Issues/Barriers to RETURNING to current housing: yes  Potential Assistance needed at discharge: (P) N/A            Potential DME:    Patient expects to discharge to: (P) [address] [place] [address] for transportation at discharge: (P) Family    Financial    Payor: Ros Zaragoza / Plan: [employer] HMO / Product Type: Medicare /     Does insurance require precert for SNF: Yes    Potential assistance Purchasing Medications: (P) Yes (will need meds to beds, does not know what pharmacy he uses)  Meds-to-Beds request:      No Pharmacies Listed    Notes:    Factors facilitating achievement of predicted outcomes: cooperative    Barriers to discharge: will need to determine needs    Additional Case Management Notes: awaiting PT/OT evals for needs    The Plan for Transition of Care is related to the following treatment goals of Ischemic stroke ([facility] [address] [I63.9]  Cerebrovascular accident (CVA), unspecified mechanism ([address] [D44.2]    IF APPLICABLE: The Patient and/or patient representative Marita Miles and his family were provided with a choice of provider and agrees with the discharge plan. Freedom of choice list with basic dialogue that supports the patient's individualized plan of care/goals and shares the quality data associated with the providers was provided to: (P)  (will need choices after PT/OT evals if approp for rehab)   Patient Representative Name:       The Patient and/or Patient Representative Agree with the Discharge Plan?       Melina Spencer RN  Case Management Department  Ph: [phone]      Met with RN who feels pt should have home care to help with medication management as pt admits he has not been taking his b/p meds, I met with pt and he relates he has used [facility] in the past and that is his choice, referral sent

## 2022-12-02 NOTE — CARE COORDINATION
Consult received as pt says he lives in [place] with his gfriend, unclear how he was sent here. Was saying earlier he does not want to go back to his gfriend but changes his mind this morning  Met with pt and discussed case with RN and CM. CM has spoken to pt and mother and RN has spoken with pt's best friend and will be calling his dtr. CM confirmed with pt and mother that pt lives in [place] with his gfriend and plan is for return. RN spoke with pt's best friend who stated pt has not lived in [place] for ~ 25yrs. Met with pt - he stated he lives in [place] with his gfriend and stated he plans to return there. Stated he has not lived in [place] for several years. Pt reports he has a strong support system with his 3 adult children and best friend. from childhood, who lives in [facility] had shared pt depressed and inquired about resources. Stated she had a PCP list for dtr and provided her with list of area counseling resources and walk in assessment clinics to give to dtr. Pt stated he has never been on meds for been in counseling. Advised pt that counseling resources had been given to RN to give to his dtr. Also discussed keeping his drs updated on how he is feeling.   Noted PT/OT rec PM&R consult for possible ARU

## 2022-12-02 NOTE — CONSULTS
Department of Endovascular Neurosurgery                                                                                                                      Resident Consult Note  Stroke Alert paged @ 1135am  ER Room # 12  Arrival to patient bedside @ 1145am        Reason for Consult:  Difficulty speaking   Requesting Physician:  Dr. Zeynep Galloway   Endovascular Neurosurgeon:   [x]Dr. Cheri Llamas  []Dr. Felipe Vega  []Dr. Oseas Mae   []    History Obtained From:  patient, family member - son, electronic medical record    CHIEF COMPLAINT:       Speech difficulty     HISTORY OF PRESENT ILLNESS:       The patient is a 62 y.o. male past medical history of HTN, obesity cryptogenic stroke 10/2022 possible TAL, who presents with difficulty speaking. Last know well: unknown. Patient currently is in a facility for rehab after his recent stroke, he had a follow-up appointment, while at the facility he became more aphasic and requested to be sent to ED. During that time patient was not complaining of any weakness, any headache or blurry vision. Denies any nausea or vomiting. His mother states that he was having difficulty speaking due to previous stroke. His son states that the patient does not seem like himself, unable to describe what has changed. Patient lives with his girlfriend, unable to get hold of her. On presentation: BP was 172/141, and BSL: 94. Prior to arrival patient was on  Aspirin 81, patient completed a course of Plavix 2 weeks, Lipitor 40 mg.   Smoking history: smoker    CT head was done Showed a remote lacunar infarct of the left basal ganglia, no acute abnormalities. CTA head and neck showing mild stenosis at the origin of bilateral vertebral arteries, 40% stenosis of the right ICA origin, multifocal mild stenosis of the basilar artery.   In October was seen in [facility] for high blood pressure apparently patient was unable to take his medication, was seen at [address] [place] [address] around 10/8/2022 for hypertensive emergency. Apparently patient was having dysarthria and seemed like a chronic issue known to the patientat that time patient had an MRI brain without contrast showing an acute ischemia in the left subinsular region and right cerebellum. Hemoglobin A1c 5.5, . Echo was negative for bubble study, MRA head and neck were negative for carotid stenosis. MRI abdomen as work-up for renal artery stenosis was normal.  Patient was discharged on dual antiplatelet therapy, Plavix for 2 weeks. PAST MEDICAL HISTORY :       Past Medical History:        Diagnosis Date    HTN (hypertension)     Obesity        Past Surgical History:        Procedure Laterality Date    NERVE BLOCK  08/05/2013    CAUDAL #1- DECADRON 10 MG. NERVE BLOCK  9/10/13    caudal #2 decadron 7.5 mg    NERVE BLOCK  3/28/14    duramorph 1.5mg  celestone 9mg       Social History:   Social History     Socioeconomic History    Marital status: Legally      Spouse name: Not on file    Number of children: Not on file    Years of education: Not on file    Highest education level: Not on file   Occupational History    Not on file   Tobacco Use    Smoking status: Not on file    Smokeless tobacco: Not on file   Substance and Sexual Activity    Alcohol use: Not on file    Drug use: Not on file    Sexual activity: Not on file   Other Topics Concern    Not on file   Social History Narrative    Not on file     Social Determinants of Health     Financial Resource Strain: Not on file   Food Insecurity: Not on file   Transportation Needs: Not on file   Physical Activity: Not on file   Stress: Not on file   Social Connections: Not on file   Intimate Partner Violence: Not on file   Housing Stability: Not on file       Family History:   History reviewed. No pertinent family history. Allergies:  Patient has no known allergies.     Home Medications:  Prior to Admission medications Medication Sig Start Date End Date Taking? Authorizing Provider   acetaminophen-codeine (TYLENOL/CODEINE #3) 300-30 MG per tablet Take 1 tablet by mouth every 8 hours as needed for Pain. Historical Provider, MD   amLODIPine (NORVASC) 10 MG tablet Take 10 mg by mouth daily. Historical Provider, MD   hydrochlorothiazide (HYDRODIURIL) 25 MG tablet Take 25 mg by mouth daily. Historical Provider, MD   lisinopril (PRINIVIL;ZESTRIL) 40 MG tablet Take 40 mg by mouth daily. Historical Provider, MD   nebivolol (BYSTOLIC) 5 MG tablet Take 5 mg by mouth daily. Historical Provider, MD   traMADol (ULTRAM) 50 MG tablet Take 50 mg by mouth every 8 hours as needed. Historical Provider, MD       Current Medications:   Current Facility-Administered Medications: clopidogrel (PLAVIX) tablet 75 mg, 75 mg, Oral, Daily    REVIEW OF SYSTEMS:       CONSTITUTIONAL: negative for fatigue and malaise   EYES: negative for double vision and photophobia    HEENT: negative for tinnitus and sore throat   RESPIRATORY: negative for cough, shortness of breath   CARDIOVASCULAR: negative for chest pain, palpitations   GASTROINTESTINAL: negative for nausea, vomiting   GENITOURINARY: negative for incontinence   MUSCULOSKELETAL: negative for neck or back pain   NEUROLOGICAL: negative for seizures   PSYCHIATRIC: negative for fatigue     Review of systems otherwise negative. PHYSICAL EXAM:       BP (!) 199/173   Pulse 81   Temp 99.1 °F (37.3 °C) (Oral)   Resp 20   Ht 6' (1.829 m)   Wt 300 lb (136.1 kg)   SpO2 99%   BMI 40.69 kg/m²     CONSTITUTIONAL:  Well developed, well nourished, alert and oriented x 3, in no acute distress. GCS 15, nontoxic. No dysarthria, aphasia present.    HEAD:  normocephalic, atraumatic    EYES:  PERRLA, EOMI.   ENT:  moist mucous membranes   NECK:  supple, symmetric, no midline tenderness to palpation    BACK:  without midline tenderness, step-offs or deformities    LUNGS:  Equal air entry bilaterally CARDIOVASCULAR:  normal s1 / s2   ABDOMEN:  Soft, no rigidity   NEUROLOGIC:  Mental Status:  A & O x3,awake             Cranial Nerves:    VII: Facial strength: abnormal     Motor Exam:    Drift:  absent  Tone:  normal    Motor exam is symmetrical 5 out of 5 all extremities bilaterally    Sensory:    Touch:    Right Upper Extremity:  normal  Left Upper Extremity:  normal  Right Lower Extremity:  normal  Left Lower Extremity:  normal    Deep Tendon Reflexes:    Right Bicep:  2+  Left Bicep:  2+  Right Knee:  2+  Left Knee:  2+    Plantar Response:  Right:  downgoing  Left:  downgoing    Clonus:  absent  Brewer's:  absent    Coordination/Dysmetria:  Heel to Shin:  Right:  normal  Left:  normal  Finger to Nose:   Right:  normal  Left:  normal   Dysdiadochokinesia:  N/A    Gait: Not examined    INITIAL NIH STROKE SCALE:    Time Performed:  2251 AM     1a. Level of consciousness:  0 - alert; keenly responsive  1b. Level of consciousness questions:  0 - answers both questions correctly  1c. Level of consciousness questions:  0 - performs both tasks correctly  2. Best Gaze:  0 - normal  3. Visual:  0 - no visual loss  4. Facial Palsy:  1 - minor paralysis (flattened nasolabial fold, asymmetric on smiling)  5a. Motor left arm:  0 - no drift, limb holds 90 (or 45) degrees for full 10 seconds  5b. Motor right arm:  0 - no drift, limb holds 90 (or 45) degrees for full 10 seconds  6a. Motor left le - no drift; leg holds 30 degree position for full 5 seconds  6b. Motor right le - no drift; leg holds 30 degree position for full 5 seconds  7. Limb Ataxia:  0 - absent  8. Sensory:  0 - normal; no sensory loss  9. Best Language:  2 - severe aphasia; all communication is through fragmentary expression; great need for inference, questioning, and guessing by the listener. Range of information that can be exchanged is limited; listener carries burden of communication.   Examiner cannot identify materials provided from patient response. 10.  Dysarthria:  0 - normal  11.   Extinction and Inattention:  0 - no abnormality    TOTAL:  3     SKIN:  no rash      Modified Cuming Score Scale:     [] Zero: No symptoms at all   [x] 1: No significant disability despite symptoms; able to carry out all usual duties and activities   [] 2: Slight disability; unable to carry out all previous activities, but able to look after own affairs without assistance   [] 3:Moderate disability; requiring some help, but able to walk without assistance   [] 4: Moderately severe disability; unable to walk and attend to bodily needs without assistance   [] 5:Severe disability; bedridden, incontinent and requiring constant nursing care and attention    LABS AND IMAGING:     CBC with Differential:    Lab Results   Component Value Date/Time    WBC 7.1 12/01/2022 11:45 AM    RBC 5.13 12/01/2022 11:45 AM    HGB 15.6 12/01/2022 11:45 AM    HCT 46.0 12/01/2022 11:45 AM     12/01/2022 11:45 AM    MCV 89.7 12/01/2022 11:45 AM    MCH 30.4 12/01/2022 11:45 AM    MCHC 33.9 12/01/2022 11:45 AM    RDW 13.1 12/01/2022 11:45 AM    LYMPHOPCT 32 12/01/2022 11:45 AM    MONOPCT 9 12/01/2022 11:45 AM    BASOPCT 1 12/01/2022 11:45 AM    MONOSABS 0.64 12/01/2022 11:45 AM    LYMPHSABS 2.25 12/01/2022 11:45 AM    EOSABS 0.17 12/01/2022 11:45 AM    BASOSABS 0.05 12/01/2022 11:45 AM         BMP:    Lab Results   Component Value Date/Time     12/01/2022 11:45 AM    K 4.0 12/01/2022 11:45 AM     12/01/2022 11:45 AM    CO2 21 12/01/2022 11:45 AM    BUN 19 12/01/2022 11:45 AM    LABALBU 4.4 09/20/2022 03:16 PM    CREATININE 0.89 12/01/2022 11:45 AM    CREATININE 1.07 12/01/2022 11:38 AM    CALCIUM 9.4 12/01/2022 11:45 AM    GFRAA >60 09/20/2022 03:16 PM    LABGLOM >60 12/01/2022 11:45 AM    GLUCOSE 93 12/01/2022 11:45 AM       Radiology Review:    1.) CT brain without contrast: Old left lacunar infarct, no acute abnormalities    2.) CTA Head/Neck: mild stenosis at the origin of bilateral vertebral arteries, 40% stenosis of the right ICA origin, multifocal mild stenosis of the basilar artery. 3.) Brain MRI W/O: Subacute left corpus callosal infarct    ASSESSMENT AND PLAN:       Patient Active Problem List   Diagnosis    HTN (hypertension)    Obesity    Spinal stenosis, lumbar region, without neurogenic claudication    Ischemic stroke [facility])       Assessment                 62 y.o. male who presents with history of cryptogenic stroke October 2022, hypertension, presented with difficulty speaking unknown duration, was at a facility for follow-up and requested 911. Initial blood pressure 170s,    Last Known Well (date and time): Unknown    2. Candidate for IV Tenecteplase therapy     Yes []     No  [x] due to the following exclusion criteria: Low NIH    3. Candidate for Thrombectomy    Yes []      No [x] due to the following exclusion criteria: No LVO    - Discussed with Dr. Nnamdi Amaya     Recommendations:    [x] General Neurology Care Status - prefer 5th floor (5A/5C)   [] Internal Medicine General Care Status   [] NICU Status - (5B)     [] MICU Status   [] Observation Status    Please use the following admission order set for stroke admission:   [] [id number] - ADDISON Intercerebral Hemorrhage Admission   [] [id number] - ADDISON Sub Arachnoid Hemorrhage Admission   [] [id number] - ADDISON Ischemic Stroke TPA Treatment Focused   [] [id number] - IP Ischemic Stroke ICU Post Alteplase (TPA) Admission    [x] [id number] - GEN Ischemic Stroke Non-Thrombolytic Focussed      - Permissive hypertension  - MRI brain without contrast   - Continue with aspirin 81 mg  - Continue Lipitor 40 mg  - Start Plavix 75 mg        Additional recommendations may follow    Please contact [facility] with any changes in patients neurologic status. Thank you for your consult.        Ayla Dumas MD   PGY 2 Neurology Resident  12/1/2022 at 7:06 PM

## 2022-12-02 NOTE — PROGRESS NOTES
[facility]  Office: [address] [facility], DO, Jean Marie Golden, DO, Brigid Buckner, DO, Fang Marcum, DO, Crow Wilkins MD, Nadia Solorio MD, Princess Davide MD, Sue Suggs MD,  Ian Crane MD, Lisa Ricks MD, Ladell Scheuermann, DO, Roc Oliveira MD,  Gaviota De MD, Ronak Jean MD, Noble Varela, DO, Shilpi Moore MD, Jannet Roland MD, Alana Anguiano DO, Shawnee Ann MD, Sharla Yun MD, Gabriella Lion MD, Raegan Stallworth MD, Jeremiah Joyce DO, Josr Muñoz MD, Lillian Adorno MD, Dolores Malik, [facility],  José Luis Hartman, CNP, Omega Rodriguez, CNP, Jessica Love, CNP,  Monica Olsen, [facility], Snow Clark, CNP, Carla Ramirez, CNP, Efrain Sung, CNP, Arleen Alpers, CNP, Kristen Salvador, CNP, Jerene Oppenheim, PA-C, Frances Miranda, CNS, Dav Bravo, CNP, Albania Smart, CNP           [address]    Progress Note    12/2/2022    11:28 AM    Name:   Ashlee Marquez  MRN:     [id number]     Acct:      [de-identified]   Room:   [address] [place], [place] [address] Day:  1  Admit Date:  12/1/2022 11:28 AM    PCP:   No primary care provider on file. Code Status:  Full Code    Subjective:     C/C:   Chief Complaint   Patient presents with    Altered Mental Status    Aphasia            Interval History Status: improved. Pt reports feeling better today. Pt has no aphasia and is AOx4. Brief History:     Pt is a 63 y/o male who presented to the ED with AMS and aphasia. Patient's last known well time unknown. Pt has a history of a recent stroke in October 2022. Pt denied HA, blurry vision, or weakness. CTA significant for mild stenosis of bilateral vertebral arteries. Brain MRI significant for subacute left corpus callosal infarct. Pt also has a history of HTN, unknown if patient is compliant with medications. Patient has no sensory or visual deficits.      Review of Systems:     Constitutional:  negative for chills, fevers, sweats  Respiratory:  negative for cough, dyspnea on exertion, shortness of breath, wheezing  Cardiovascular:  negative for chest pain, chest pressure/discomfort, lower extremity edema, palpitations  Gastrointestinal:  negative for abdominal pain, constipation, diarrhea, nausea, vomiting  Neurological:  negative for dizziness, headache    Medications: Allergies:  No Known Allergies    Current Meds:   Scheduled Meds:    clopidogrel  75 mg Oral Daily    amLODIPine  10 mg Oral Daily    hydroCHLOROthiazide  25 mg Oral Daily    lisinopril  40 mg Oral Daily    enoxaparin  30 mg SubCUTAneous BID    aspirin  81 mg Oral Daily    Or    aspirin  300 mg Rectal Daily    atorvastatin  40 mg Oral Nightly     Continuous Infusions:    sodium chloride 75 mL/hr at 22 2131     PRN Meds: ondansetron **OR** ondansetron, polyethylene glycol, labetalol    Data:     Past Medical History:   has a past medical history of HTN (hypertension) and Obesity. Social History:        Family History: History reviewed. No pertinent family history. Vitals:  BP (!) 152/98   Pulse 61   Temp 98.9 °F (37.2 °C) (Oral)   Resp 13   Ht 6' (1.829 m)   Wt (!) 302 lb 1.6 oz (137 kg)   SpO2 98%   BMI 40.97 kg/m²   Temp (24hrs), Av °F (37.2 °C), Min:98.9 °F (37.2 °C), Max:99.1 °F (37.3 °C)    Recent Labs     22  1138   POCGLU 94       I/O (24Hr):     Intake/Output Summary (Last 24 hours) at 2022 1128  Last data filed at 2022 0100  Gross per 24 hour   Intake --   Output 400 ml   Net -400 ml       Labs:  Hematology:  Recent Labs     22  1145 22  0416   WBC 7.1 6.6   RBC 5.13 4.46   HGB 15.6 13.2   HCT 46.0 41.7   MCV 89.7 93.5   MCH 30.4 29.6   MCHC 33.9 31.7   RDW 13.1 13.2    146   MPV 11.9 11.4   INR 1.0  --      Chemistry:  Recent Labs     22  1138 22  1145   NA  --  138   K  --  4.0   CL  --  104   CO2  --  21   GLUCOSE  --  93   BUN  --  19   CREATININE 1.07 0.89   ANIONGAP  --  13   LABGLOM --  >60   CALCIUM  --  9.4   TROPHS  --  16   CKTOTAL  --  165   MYOGLOBIN  --  51     Recent Labs     12/01/22  1138   POCGLU 94     ABG:No results found for: POCPH, PHART, PH, POCPCO2, FDY0CKQ, PCO2, POCPO2, PO2ART, PO2, POCHCO3, FRJ5XCM, HCO3, NBEA, PBEA, BEART, BE, THGBART, THB, TSS9GEN, VYBM1BWF, L4HGZBKU, O2SAT, FIO2  No results found for: SPECIAL  No results found for: CULTURE    Radiology:  CT HEAD WO CONTRAST    Result Date: 12/1/2022  No acute intracranial abnormality. Remote lacunar infarct in the left basal ganglia. Findings were discussed with Randi Long at 12:16 pm on 12/1/2022. CTA HEAD NECK W CONTRAST    Result Date: 12/1/2022  Mild stenosis at the origin of bilateral vertebral arteries. Mild 40% stenosis at the origin of right internal carotid artery. Multifocal areas of mild stenosis involving the basilar artery. Otherwise unremarkable CTA of the head and neck. MRI BRAIN WO CONTRAST    Result Date: 12/1/2022  Small subacute infarct in the left anterior corpus callosum. Small chronic infarcts and microvascular disease likely representing chronic hypertensive encephalopathy. Physical Examination:        General appearance:  alert, cooperative and no distress  Mental Status:  oriented to person, place and time and normal affect  Lungs:  clear to auscultation bilaterally, normal effort  Heart:  regular rate and rhythm, no murmur  Abdomen:  soft, nontender, nondistended, normal bowel sounds, no masses, hepatomegaly, splenomegaly  Extremities:  no edema, redness, tenderness in the calves  Skin:  no gross lesions, rashes, induration    Assessment:        Hospital Problems             Last Modified POA    * (Principal) Ischemic stroke ([facility] [address] 12/1/2022 Yes    Hypertensive emergency 12/1/2022 Yes    Hypertensive urgency 12/1/2022 Yes    Dysarthria due to recent stroke 12/1/2022 Yes       Plan:        Ischemic Stroke: Will continue aspirin and Lipitor. Will start Plavix per neurology. Hypertensive Urgency: Will resume home BP medications. Continue Labetalol as needed. If current HTN is resistant to Labetalol, will starts Cardene infusion. If BP is not under control by the time of D/C, will consider adjusting home medications. Stevie Amor  12/2/2022  11:28 AM        Attending Supervising Physicians Attestation Statement  I, Rosalva Murphy MD, have seen and examined Orquidea Cornejo and personally performed and participated in the key or critical portions of the evaluation and management including personally performing the exam and medical decision making. I verify the accuracy of the documentation by the medical/PA student with the following additions/changes. Additional Comments: home BP medications resumed, BP currently controlled. Questionable compliance on home medications. Discussed with RN. AMBER fluids. Further stroke management per primary.  Call with questions     Electronically signed by Rosalva Murphy MD on 12/2/2022 at 12:41 PM

## 2022-12-03 VITALS
HEIGHT: 72 IN | BODY MASS INDEX: 40.92 KG/M2 | HEART RATE: 102 BPM | DIASTOLIC BLOOD PRESSURE: 100 MMHG | TEMPERATURE: 98.3 F | OXYGEN SATURATION: 99 % | WEIGHT: 302.1 LBS | SYSTOLIC BLOOD PRESSURE: 154 MMHG | RESPIRATION RATE: 15 BRPM

## 2022-12-03 LAB
CHOLESTEROL/HDL RATIO: 4.3
CHOLESTEROL: 125 MG/DL
HDLC SERPL-MCNC: 29 MG/DL
LDL CHOLESTEROL: 76 MG/DL (ref 0–130)
TRIGL SERPL-MCNC: 102 MG/DL

## 2022-12-03 PROCEDURE — APPSS30 APP SPLIT SHARED TIME 16-30 MINUTES: Performed by: NURSE PRACTITIONER

## 2022-12-03 PROCEDURE — 80061 LIPID PANEL: CPT

## 2022-12-03 PROCEDURE — 99239 HOSP IP/OBS DSCHRG MGMT >30: CPT | Performed by: PSYCHIATRY & NEUROLOGY

## 2022-12-03 PROCEDURE — 99239 HOSP IP/OBS DSCHRG MGMT >30: CPT | Performed by: NURSE PRACTITIONER

## 2022-12-03 PROCEDURE — 6370000000 HC RX 637 (ALT 250 FOR IP): Performed by: INTERNAL MEDICINE

## 2022-12-03 PROCEDURE — 6360000002 HC RX W HCPCS

## 2022-12-03 PROCEDURE — 83036 HEMOGLOBIN GLYCOSYLATED A1C: CPT

## 2022-12-03 PROCEDURE — 99232 SBSQ HOSP IP/OBS MODERATE 35: CPT | Performed by: INTERNAL MEDICINE

## 2022-12-03 PROCEDURE — 6370000000 HC RX 637 (ALT 250 FOR IP)

## 2022-12-03 PROCEDURE — 36415 COLL VENOUS BLD VENIPUNCTURE: CPT

## 2022-12-03 RX ORDER — CLOPIDOGREL BISULFATE 75 MG/1
75 TABLET ORAL DAILY
Qty: 30 TABLET | Refills: 3 | Status: SHIPPED | OUTPATIENT
Start: 2022-12-04

## 2022-12-03 RX ORDER — ATORVASTATIN CALCIUM 40 MG/1
40 TABLET, FILM COATED ORAL NIGHTLY
Qty: 30 TABLET | Refills: 3 | Status: SHIPPED | OUTPATIENT
Start: 2022-12-03

## 2022-12-03 RX ORDER — NEBIVOLOL 5 MG/1
5 TABLET ORAL DAILY
Qty: 90 TABLET | Refills: 0 | Status: SHIPPED | OUTPATIENT
Start: 2022-12-03 | End: 2023-03-03

## 2022-12-03 RX ORDER — AMLODIPINE BESYLATE 10 MG/1
10 TABLET ORAL DAILY
Qty: 90 TABLET | Refills: 0 | Status: SHIPPED | OUTPATIENT
Start: 2022-12-03 | End: 2023-03-03

## 2022-12-03 RX ORDER — ASPIRIN 81 MG/1
81 TABLET ORAL DAILY
Qty: 30 TABLET | Refills: 3 | Status: SHIPPED | OUTPATIENT
Start: 2022-12-04

## 2022-12-03 RX ORDER — NEBIVOLOL 5 MG/1
5 TABLET ORAL DAILY
Qty: 90 TABLET | Refills: 0 | Status: SHIPPED | OUTPATIENT
Start: 2022-12-03 | End: 2022-12-03 | Stop reason: SDUPTHER

## 2022-12-03 RX ORDER — LISINOPRIL 40 MG/1
40 TABLET ORAL DAILY
Qty: 90 TABLET | Refills: 0 | Status: SHIPPED | OUTPATIENT
Start: 2022-12-03 | End: 2022-12-03 | Stop reason: SDUPTHER

## 2022-12-03 RX ORDER — HYDROCHLOROTHIAZIDE 25 MG/1
25 TABLET ORAL DAILY
Qty: 90 TABLET | Refills: 0 | Status: SHIPPED | OUTPATIENT
Start: 2022-12-03 | End: 2022-12-03 | Stop reason: SDUPTHER

## 2022-12-03 RX ORDER — HYDROCHLOROTHIAZIDE 25 MG/1
25 TABLET ORAL DAILY
Qty: 90 TABLET | Refills: 0 | Status: SHIPPED | OUTPATIENT
Start: 2022-12-03 | End: 2023-03-03

## 2022-12-03 RX ORDER — AMLODIPINE BESYLATE 10 MG/1
10 TABLET ORAL DAILY
Qty: 90 TABLET | Refills: 0 | Status: SHIPPED | OUTPATIENT
Start: 2022-12-03 | End: 2022-12-03 | Stop reason: SDUPTHER

## 2022-12-03 RX ORDER — LISINOPRIL 40 MG/1
40 TABLET ORAL DAILY
Qty: 90 TABLET | Refills: 0 | Status: SHIPPED | OUTPATIENT
Start: 2022-12-03 | End: 2023-03-03

## 2022-12-03 RX ADMIN — AMLODIPINE BESYLATE 10 MG: 10 TABLET ORAL at 08:13

## 2022-12-03 RX ADMIN — HYDROCHLOROTHIAZIDE 25 MG: 25 TABLET ORAL at 08:12

## 2022-12-03 RX ADMIN — LISINOPRIL 40 MG: 20 TABLET ORAL at 08:12

## 2022-12-03 RX ADMIN — Medication 81 MG: at 08:13

## 2022-12-03 RX ADMIN — CLOPIDOGREL 75 MG: 75 TABLET, FILM COATED ORAL at 08:13

## 2022-12-03 RX ADMIN — ENOXAPARIN SODIUM 30 MG: 100 INJECTION SUBCUTANEOUS at 08:13

## 2022-12-03 NOTE — PROGRESS NOTES
[facility]  Office: [address], DO, Lamin Eric, DO, David Lindoul, DO, Patrizia Marcum, DO, Westley Bui MD, Alison Pérez MD, Mala Santana MD, Everardo Hagan MD,  Denisse Schulte MD, Antoine Leyva MD, Danish Warner, DO, Shanell Wilkinson MD,  Judah Field MD, Jessica Ruiz MD, Jimbo Kerns, DO, Onur Grace MD, Sebastian Mullins MD, Shashank Jamison, DO, Sanchez Weber MD, uJs Mccracken MD, Roverto Hartmann MD, Jose Peña MD, Negro Cruz DO, Zeny Hancock MD, Ronnie Jones MD, Romayne Muscat, CNP,  Harvey Paul, CNP, Alexey Rivera, CNP, Boogie Rosales, CNP,  Carol Cole, [facility], Isaac Mcnally, CNP, Ismael Looney, CNP, Margarette Cho, CNP, Elle Kimbrough, CNP, Svetlana Cortez, CNP, DAX SimpsonC, Shahram Suarez, PARVIZ, Jazmyne Mancini, CNP, Cinthia Anne, CNP         [facility] [address] [place] [address]    Progress Note    12/3/2022    10:50 AM    Name:   Yenny Coy  MRN:     [id number]     Acct:      [de-identified]   Room:   [address] [place] [address] Day:  2  Admit Date:  12/1/2022 11:28 AM    PCP:   No primary care provider on file. Code Status:  Full Code    Subjective:     C/C:   Chief Complaint   Patient presents with    Altered Mental Status    Aphasia            Interval History Status: not changed. No issues overnight  BP controlled on home meds  Counseled on compliance with home medications  Discussed with RN  DC planning per primary     Review of Systems:     Constitutional:  negative for chills, fevers, sweats  Respiratory:  negative for cough, dyspnea on exertion, shortness of breath, wheezing  Cardiovascular:  negative for chest pain, chest pressure/discomfort, lower extremity edema, palpitations  Gastrointestinal:  negative for abdominal pain, constipation, diarrhea, nausea, vomiting  Neurological:  negative for dizziness, headache    Medications:      Allergies:  No Known Allergies    Current Meds: Scheduled Meds:    clopidogrel  75 mg Oral Daily    amLODIPine  10 mg Oral Daily    hydroCHLOROthiazide  25 mg Oral Daily    lisinopril  40 mg Oral Daily    enoxaparin  30 mg SubCUTAneous BID    aspirin  81 mg Oral Daily    Or    aspirin  300 mg Rectal Daily    atorvastatin  40 mg Oral Nightly     Continuous Infusions:   PRN Meds: ondansetron **OR** ondansetron, polyethylene glycol, labetalol    Data:     Past Medical History:   has a past medical history of HTN (hypertension) and Obesity. Social History:        Family History: History reviewed. No pertinent family history. Vitals:  /82   Pulse 78   Temp 98.5 °F (36.9 °C) (Oral)   Resp 16   Ht 6' (1.829 m)   Wt (!) 302 lb 1.6 oz (137 kg)   SpO2 100%   BMI 40.97 kg/m²   Temp (24hrs), Av.6 °F (37 °C), Min:98.5 °F (36.9 °C), Max:98.8 °F (37.1 °C)    Recent Labs     22  1138   POCGLU 94       I/O (24Hr):     Intake/Output Summary (Last 24 hours) at 12/3/2022 1050  Last data filed at 12/3/2022 1029  Gross per 24 hour   Intake 450 ml   Output 700 ml   Net -250 ml       Labs:  Hematology:  Recent Labs     22  1145 22  0416   WBC 7.1 6.6   RBC 5.13 4.46   HGB 15.6 13.2   HCT 46.0 41.7   MCV 89.7 93.5   MCH 30.4 29.6   MCHC 33.9 31.7   RDW 13.1 13.2    146   MPV 11.9 11.4   INR 1.0  --      Chemistry:  Recent Labs     22  1138 22  1145   NA  --  138   K  --  4.0   CL  --  104   CO2  --  21   GLUCOSE  --  93   BUN  --  19   CREATININE 1.07 0.89   ANIONGAP  --  13   LABGLOM  --  >60   CALCIUM  --  9.4   TROPHS  --  16   CKTOTAL  --  165   MYOGLOBIN  --  51     Recent Labs     22  1138 22  0941   CHOL  --  125   HDL  --  29*   LDLCHOLESTEROL  --  76   CHOLHDLRATIO  --  4.3   TRIG  --  102   POCGLU 94  --      ABG:No results found for: POCPH, PHART, PH, POCPCO2, HLI3ROS, PCO2, POCPO2, PO2ART, PO2, POCHCO3, KML7QWK, HCO3, NBEA, PBEA, BEART, BE, THGBART, THB, MIC5CIZ, OFVM0ZXK, N8AACGJI, O2SAT, FIO2  No results found for: SPECIAL  No results found for: CULTURE    Radiology:  CT HEAD WO CONTRAST    Result Date: 12/1/2022  No acute intracranial abnormality. Remote lacunar infarct in the left basal ganglia. Findings were discussed with Sunita Gilliam at 12:16 pm on 12/1/2022. CTA HEAD NECK W CONTRAST    Result Date: 12/1/2022  Mild stenosis at the origin of bilateral vertebral arteries. Mild 40% stenosis at the origin of right internal carotid artery. Multifocal areas of mild stenosis involving the basilar artery. Otherwise unremarkable CTA of the head and neck. MRI BRAIN WO CONTRAST    Result Date: 12/1/2022  Small subacute infarct in the left anterior corpus callosum. Small chronic infarcts and microvascular disease likely representing chronic hypertensive encephalopathy.        Physical Examination:        General appearance:  alert, cooperative and no distress  Mental Status:  oriented to person, place and time and normal affect  Lungs:  clear to auscultation bilaterally, normal effort  Heart:  regular rate and rhythm  Abdomen:  soft, nontender, nondistended, normal bowel sounds  Extremities:  no edema, redness, tenderness in the calves  Skin:  no gross lesions, rashes, induration    Assessment:        Hospital Problems             Last Modified POA    * (Principal) Ischemic stroke ([facility] [address] 12/1/2022 Yes    Hypertensive emergency 12/1/2022 Yes    Hypertensive urgency 12/1/2022 Yes    Dysarthria due to recent stroke 12/1/2022 Yes    Lacunar stroke ([facility] [address] 12/2/2022 Yes       Plan:        - Vitals, labs, imaging, medications reviewed  - Continue current home BP medications  - Counseled on compliance  - Home meds refilled    Ok to DC from IM perspective  Discussed with ROSAURA Gagnon MD  12/3/2022  10:50 AM

## 2022-12-03 NOTE — PLAN OF CARE
Problem: Discharge Planning  Goal: Discharge to home or other facility with appropriate resources  Outcome: Completed     Problem: Skin/Tissue Integrity  Goal: Absence of new skin breakdown  Description: 1. Monitor for areas of redness and/or skin breakdown  2. Assess vascular access sites hourly  3. Every 4-6 hours minimum:  Change oxygen saturation probe site  4. Every 4-6 hours:  If on nasal continuous positive airway pressure, respiratory therapy assess nares and determine need for appliance change or resting period.   Outcome: Completed     Problem: ABCDS Injury Assessment  Goal: Absence of physical injury  Outcome: Completed     Problem: Safety - Adult  Goal: Free from fall injury  Outcome: Completed     Problem: Chronic Conditions and Co-morbidities  Goal: Patient's chronic conditions and co-morbidity symptoms are monitored and maintained or improved  Outcome: Completed     Problem: Pain  Goal: Verbalizes/displays adequate comfort level or baseline comfort level  Outcome: Completed

## 2022-12-03 NOTE — PROGRESS NOTES
NEUROLOGY INPATIENT PROGRESS NOTE    12/3/2022         Current Exam:     Chart reviewed. Discussed with RN. Patient wants to discharge home today. Is agreeable to home PT/OT. He has not acute complaints overnight, no changes in exam. Blood pressures are improved today. Importance of medication compliance discussed. Brief History:    iMsha Lopez is a  62 y.o. male with H/O HTN, prior stroke with residual speech difficulty, who was admitted on 12/1/2022 with hypertensive urgency. BP on arrival 172/141. Was found to have a subacute left frontal infarct with chronic lacunar infarcts in the left basal ganglia and right cerebellum. He also had mild areas of scattered atherosclerosis. He was continued on dual antiplatelet therapy with aspirin and Plavix as well as statin. Internal medicine was consulted to manage blood pressures and have made medication adjustments. There is concern for medication noncompliance at home. No current facility-administered medications on file prior to encounter. Current Outpatient Medications on File Prior to Encounter   Medication Sig Dispense Refill    acetaminophen-codeine (TYLENOL/CODEINE #3) 300-30 MG per tablet Take 1 tablet by mouth every 8 hours as needed for Pain. amLODIPine (NORVASC) 10 MG tablet Take 10 mg by mouth daily. hydrochlorothiazide (HYDRODIURIL) 25 MG tablet Take 25 mg by mouth daily. lisinopril (PRINIVIL;ZESTRIL) 40 MG tablet Take 40 mg by mouth daily. nebivolol (BYSTOLIC) 5 MG tablet Take 5 mg by mouth daily. traMADol (ULTRAM) 50 MG tablet Take 50 mg by mouth every 8 hours as needed. Allergies: Misha Lopez has No Known Allergies. Past Medical History:   Diagnosis Date    HTN (hypertension)     Obesity        Past Surgical History:   Procedure Laterality Date    NERVE BLOCK  08/05/2013    CAUDAL #1- DECADRON 10 MG.     NERVE BLOCK  9/10/13    caudal #2 decadron 7.5 mg    NERVE BLOCK  3/28/14    duramorph 1.5mg  celestone 9mg       Social History: Marcela Stabs      History reviewed. No pertinent family history. Objective:   BP (!) 157/81   Pulse 99   Temp 98.8 °F (37.1 °C) (Oral)   Resp 17   Ht 6' (1.829 m)   Wt (!) 302 lb 1.6 oz (137 kg)   SpO2 96%   BMI 40.97 kg/m²     Blood pressure range: Systolic (83MTH), VNM:678 , Min:123 , JXE:295   ; Diastolic (54RWH), QKT:03, Min:78, Max:92      Review of Systems:  Constitutional  Negative for fever and chills    HEENT  Negative for ear discharge, ear pain, nosebleed    Eyes  Negative for photophobia, pain and discharge    Respiratory  Negative for hemoptysis and sputum    Cardiovascular  Negative for orthopnea, claudication and PND    Gastrointestinal  Negative for abdominal pain, diarrhea, blood in stool    Musculoskeletal  Negative for joint pain, negative for myalgia    Skin  Negative for rash or itching    Endo/heme/allergies  Negative for polydipsia, environmental allergy    Psychiatric/behavioral  Negative for suicidal ideation.  Patient is not anxious        NEUROLOGIC EXAMINATION  GENERAL  Appears comfortable and in no distress   HEENT  NC/ AT   NECK  Supple   MENTAL STATUS:  Alert, oriented, intact memory, no confusion, dysarthric speech, normal language, no hallucination or delusion   CRANIAL NERVES: II     -      Visual fields intact to confrontation  III,IV,VI -  EOMs full, no afferent defect, no NICA, no ptosis  V     -     Normal facial sensation  VII    -     Normal facial symmetry  VIII   -     Intact hearing  IX,X -     Symmetrical palate  XI    -     Symmetrical shoulder shrug  XII   -     Midline tongue, no atrophy    MOTOR FUNCTION:  Lifts all limbs antigravity with normal bulk, normal tone and no involuntary movements, no tremor   SENSORY FUNCTION:  Normal touch, normal pin   CEREBELLAR FUNCTION:  Intact fine motor control over upper limbs   REFLEX FUNCTION:  Symmetric, no perverted reflex, no Babinski sign   STATION and GAIT  Not tested Data:    Lab Results:   CBC:   Recent Labs     12/01/22  1145 12/02/22  0416   WBC 7.1 6.6   HGB 15.6 13.2    146     BMP:    Recent Labs     12/01/22  1138 12/01/22  1145   NA  --  138   K  --  4.0   CL  --  104   CO2  --  21   BUN  --  19   CREATININE 1.07 0.89   GLUCOSE  --  93         Lab Results   Component Value Date    ALT 21 09/20/2022    AST 26 09/20/2022    INR 1.0 12/01/2022           Diagnostic data reviewed:  CT HEAD (12/1/22) -  No acute intracranial abnormality. Remote lacunar infarct in the left basal ganglia. CTA HEAD AND NECK (12/1/22) -  Mild stenosis at the origin of bilateral vertebral arteries. Mild 40% stenosis at the origin of right internal carotid artery. Multifocal areas of mild stenosis involving the basilar artery. Otherwise unremarkable CTA of the head and neck. BRAIN MRI (12/1/22) -  Small subacute infarct in the left anterior corpus callosum. Small chronic infarcts and microvascular disease likely representing chronic   hypertensive encephalopathy. Impression:  -Hypertensive urgency  -Subacute left frontal infarct with chronic lacunar infarcts in left basal ganglia and right cerebellum  -Mild iCAD    Plan:  -Continue aspirin, Plavix, Lipitor  -Internal medicine managing blood pressure; appreciate recommendations  -ECHO done recently and reviewed in Care Everywhere  -Most recent   -Continue therapies; patient does not want to discharge to a facility but is agreeable to home health care and home therapy  -DVT prophylaxis; on Lovenox    Please note that this note was generated using a voice recognition dictation software. Although every effort was made to ensure the accuracy of this automated transcription, some errors in transcription may have occurred.

## 2022-12-03 NOTE — CARE COORDINATION
Met with patient to discuss transitional planning. Plan is home with home healthcare. Spoke with patient and he does not have a PCP. No response from [facility] regarding [facility] referral. Asked for patient for more home care choices and he is agreeable to referral to [facility]. Informed patient that he will need a PCP for home healthcare to follow. Provided patient with [facility] PCP list for patient to call and make new patient appointment. [phone]: spoke with mehreen with [facility] to see if they would be willing to accept patient if neurology follows until he can get established with a pcp. Karen Junaid states she will check to see if they can accept and call me back    1310: received call from mehreen with [facility] and they can accept patient if Dr Devang Estrada able to follow for home care orders until patient establishes with PCP    1400: spoke with mehreen from [facility] who states patient has home care auth on file with [facility] and may be current with them. 1430: spoke with Corby Frankel with [facility] who states patient was just recently discharged from them but they could accept him back. She states his pcp on file with them is leda alvarez. 1450: met with patient to discuss home care again and asked if he previously had [facility] and if he would like them to come out to see him. Patient states he is agreeable to [facility]. Informed patient that [facility] stated his pcp is leda alvarez and patient confirmed that. Updated becky. Notified [facility] that patient is agreeable to having them see him again and that patient will discharge home today.

## 2022-12-03 NOTE — DISCHARGE SUMMARY
[facility]  [address] [facility]    Discharge Summary     Patient ID: Marcela Salgado  :  1965   MRN: [id number]     ACCOUNT:  [de-identified]   Patient's PCP: No primary care provider on file. Admit Date: 2022   Discharge Date: 12/3/2022     Length of Stay: 2  Code Status:  Full Code  Admitting Physician: Lefty Holm DO  Discharge Physician: DARREN Hu CNP     Active Discharge Diagnoses:     Hospital Problem Lists:  Active Problems:    Hypertensive urgency    Dysarthria due to recent stroke  Resolved Problems:    * No resolved hospital problems. *      Admission Condition:  stable     Discharged Condition: stable    Hospital Stay:     Hospital Course:  Marcela Salgado is a 62 y.o. male who was admitted for the management of hypertension. BP on arrival 172/141. Was found to have a subacute left frontal infarct with chronic lacunar infarcts in the left basal ganglia and right cerebellum. He also had mild areas of scattered atherosclerosis. He reports his dysarthria on arrival is not new and there were no acutely new areas of stroke found. He was continued on dual antiplatelet therapy with aspirin and Plavix as well as statin. Internal medicine was consulted to manage blood pressures and have made medication adjustments. There is concern for medication noncompliance at home. Patient was educated on importance of taking medications as prescribed. He declines the offer of going to a SNF or rehab facility and only wants to go home, but is agreeable to home PT/OT. On day of discharge he is alert, oriented with dysarthric speech. He will need follow up with his PCP, endovascular, neurology. Significant therapeutic interventions: Blood pressure medication adjustments.     Significant Diagnostic Studies:   Labs / Micro:  CBC:   Lab Results   Component Value Date/Time    WBC 6.6 2022 04:16 AM    RBC 4.46 2022 04:16 AM    HGB 13.2 12/02/2022 04:16 AM    HCT 41.7 12/02/2022 04:16 AM    MCV 93.5 12/02/2022 04:16 AM    MCH 29.6 12/02/2022 04:16 AM    MCHC 31.7 12/02/2022 04:16 AM    RDW 13.2 12/02/2022 04:16 AM     12/02/2022 04:16 AM     BMP:    Lab Results   Component Value Date/Time    GLUCOSE 93 12/01/2022 11:45 AM     12/01/2022 11:45 AM    K 4.0 12/01/2022 11:45 AM     12/01/2022 11:45 AM    CO2 21 12/01/2022 11:45 AM    ANIONGAP 13 12/01/2022 11:45 AM    BUN 19 12/01/2022 11:45 AM    CREATININE 0.89 12/01/2022 11:45 AM    CREATININE 1.07 12/01/2022 11:38 AM    CALCIUM 9.4 12/01/2022 11:45 AM    LABGLOM >60 12/01/2022 11:45 AM    GFRAA >60 09/20/2022 03:16 PM    GFR      09/20/2022 03:16 PM       Radiology:    CT HEAD WO CONTRAST    Result Date: 12/1/2022  EXAMINATION: CT OF THE HEAD WITHOUT CONTRAST  12/1/2022 11:39 am TECHNIQUE: CT of the head was performed without the administration of intravenous contrast. Automated exposure control, iterative reconstruction, and/or weight based adjustment of the mA/kV was utilized to reduce the radiation dose to as low as reasonably achievable. COMPARISON: None. HISTORY: ORDERING SYSTEM PROVIDED HISTORY: AMS TECHNOLOGIST PROVIDED HISTORY: AMS Decision Support Exception - unselect if not a suspected or confirmed emergency medical condition->Emergency Medical Condition (MA) Reason for Exam: Aphasia FINDINGS: BRAIN/VENTRICLES: There is no acute intracranial hemorrhage, mass effect, or midline shift. There is satisfactory overall gray-white matter differentiation. There is a small remote lacunar infarct in left basal ganglia. The ventricular structures are symmetric and unremarkable. The infratentorial structures are unremarkable. ORBITS: The visualized portion of the orbits demonstrate no acute abnormality. SINUSES: The visualized paranasal sinuses and mastoid air cells demonstrate no acute abnormality.  SOFT TISSUES/SKULL:  No acute abnormality of the visualized skull or soft tissues. No acute intracranial abnormality. Remote lacunar infarct in the left basal ganglia. Findings were discussed with Varinder Pritchard at 12:16 pm on 12/1/2022. CTA HEAD NECK W CONTRAST    Result Date: 12/1/2022  EXAMINATION: CTA OF THE HEAD AND NECK WITH CONTRAST 12/1/2022 11:39 am: TECHNIQUE: CTA of the head and neck was performed with the administration of intravenous contrast. Multiplanar reformatted images are provided for review. MIP images are provided for review. Stenosis of the internal carotid arteries measured using NASCET criteria. Automated exposure control, iterative reconstruction, and/or weight based adjustment of the mA/kV was utilized to reduce the radiation dose to as low as reasonably achievable. COMPARISON: None. HISTORY: ORDERING SYSTEM PROVIDED HISTORY: Aphasia TECHNOLOGIST PROVIDED HISTORY: Aphasia Decision Support Exception - unselect if not a suspected or confirmed emergency medical condition->Emergency Medical Condition (MA) Reason for Exam: Aphasia FINDINGS: CTA NECK: AORTIC ARCH/ARCH VESSELS: No dissection or arterial injury. No significant stenosis of the brachiocephalic or subclavian arteries. Mild stenosis at the origin of bilateral vertebral arteries. CAROTID ARTERIES: No dissection, arterial injury. There is mild atherosclerotic calcification of bilateral carotid bulbs extending into the origin of internal carotid arteries resulting in mild 40% stenosis at the origin of right internal carotid artery. VERTEBRAL ARTERIES: No dissection, arterial injury, or significant stenosis. Left vertebral artery is dominant. SOFT TISSUES: The lung apices are clear. No cervical or superior mediastinal lymphadenopathy. The larynx and pharynx are unremarkable. No acute abnormality of the salivary and thyroid glands. BONES: No acute osseous abnormality.  CTA HEAD: ANTERIOR CIRCULATION: No significant stenosis of the intracranial internal carotid, anterior cerebral, or middle cerebral arteries. No aneurysm. POSTERIOR CIRCULATION: Multifocal areas of mild stenosis involving the basilar artery. Fetal configuration of right PCA. No significant stenosis of the vertebral, basilar, or posterior cerebral arteries. No aneurysm. OTHER: No dural venous sinus thrombosis on this non-dedicated study. BRAIN: For detailed description please refer to the same-day head CT. .     Mild stenosis at the origin of bilateral vertebral arteries. Mild 40% stenosis at the origin of right internal carotid artery. Multifocal areas of mild stenosis involving the basilar artery. Otherwise unremarkable CTA of the head and neck. MRI BRAIN WO CONTRAST    Result Date: 12/1/2022  EXAMINATION: MRI OF THE BRAIN WITHOUT CONTRAST,  12/1/2022 1:56 pm TECHNIQUE: Multiplanar multisequence MRI of the brain was performed without the administration of intravenous contrast. COMPARISON: None HISTORY: ORDERING SYSTEM PROVIDED HISTORY: Aphasia TECHNOLOGIST PROVIDED HISTORY: Aphasia Decision Support Exception - unselect if not a suspected or confirmed emergency medical condition->Emergency Medical Condition (MA) Reason for Exam:  Aphasia FINDINGS: INTRACRANIAL STRUCTURES/VENTRICLES: The there is a 7 mm focus of mild diffusion hyperintensity in the left side of the anterior body of the corpus callosum. There is no corresponding decrease in apparent diffusion coefficient. There is associated T2 hyperintensity. Likely this represents a subacute infarct. A few punctate foci of susceptibility artifact in the central brain likely represent chronic hemosiderin deposition. There are chronic lacunar infarcts in the periventricular white matter, basal ganglia, and cerebellar white matter/left middle cerebellar peduncle. There is wallerian degeneration on the left corticospinal tract. Scattered white matter abnormalities are nonspecific but commonly attributed to chronic microvascular ischemia.   There is no acute hemorrhage, herniation, or hydrocephalus. ORBITS: The visualized portion of the orbits demonstrate no acute abnormality. SINUSES: The visualized paranasal sinuses and mastoid air cells demonstrate no acute abnormality. BONES/SOFT TISSUES: The bone marrow signal intensity appears normal. The soft tissues demonstrate no acute abnormality. Small subacute infarct in the left anterior corpus callosum. Small chronic infarcts and microvascular disease likely representing chronic hypertensive encephalopathy. Consultations:    Consults:     Final Specialist Recommendations/Findings:   IP CONSULT TO INTERNAL MEDICINE  IP CONSULT TO SOCIAL WORK  IP CONSULT TO PHYSICAL MEDICINE REHAB  IP CONSULT TO HOME CARE NEEDS      The patient was seen and examined on day of discharge and this discharge summary is in conjunction with any daily progress note from day of discharge. Discharge plan:     Disposition: Home    Physician Follow Up:     [facility]yla Dumas MD  [facility]   [facility] [address] [facility]  [phone]    Follow up in 8 week(s)      Miri Colunga MD  [address] [facility]  [phone]    Schedule an appointment as soon as possible for a visit in 4 week(s)      Karo Reyes MD  [address] [place] [phone]    Schedule an appointment as soon as possible for a visit         Requiring Further Evaluation/Follow Up POST HOSPITALIZATION/Incidental Findings: Monitor blood pressures at home and follow up with your PCP. Diet: cardiac diet    Activity: As tolerated. Will get home PT/OT and a home health aide to help manage medications. Instructions to Patient: Keep appointments as listed above.     Discharge Medications:      Medication List        CONTINUE taking these medications      amLODIPine 10 MG tablet  Commonly known as: NORVASC  Take 1 tablet by mouth daily     hydroCHLOROthiazide 25 MG tablet  Commonly known as: HYDRODIURIL  Take 1 tablet by mouth daily     lisinopril 40 MG tablet  Commonly known as: PRINIVIL;ZESTRIL  Take 1 tablet by mouth daily     nebivolol 5 MG tablet  Commonly known as: Bystolic  Take 1 tablet by mouth daily            ASK your doctor about these medications      traMADol 50 MG tablet  Commonly known as: ULTRAM     TYLENOL/CODEINE #3 300-30 MG per tablet  Generic drug: acetaminophen-codeine               Where to Get Your Medications        You can get these medications from any pharmacy    Bring a paper prescription for each of these medications  amLODIPine 10 MG tablet  hydroCHLOROthiazide 25 MG tablet  lisinopril 40 MG tablet  nebivolol 5 MG tablet         Time Spent on discharge is  31 mins in patient examination, evaluation, counseling as well as medication reconciliation, prescriptions for required medications, discharge plan and follow up. Electronically signed by   DARREN Pardo CNP  12/3/2022  11:29 AM      Thank you Dr. Vargas primary care provider on file. for the opportunity to be involved in this patient's care.

## 2022-12-03 NOTE — PROGRESS NOTES
CLINICAL PHARMACY NOTE: MEDS TO BEDS    Total # of Prescriptions Filled: 6   The following medications were delivered to the patient:  Lisinopril  Hydrochlorothiazide  Amlodipine  Aspirin  Atorvastatin  plavix    Additional Documentation:

## 2022-12-03 NOTE — DISCHARGE INSTR - COC
Continuity of Care Form    Patient Name: Abelardo Colon   :  1965  MRN:  [id number]    [address] [facility] date:  2022  Discharge date:  12/3/2022    Code Status Order: Full Code   Advance Directives:     Admitting Physician:  Jose Cobos DO  PCP: No primary care provider on file. Discharging Nurse: [address] Unit/Room#: 0585/5227-87  Discharging Unit Phone Number: [phone]    Emergency Contact:   Extended Emergency Contact Information  Primary Emergency Contact: Ruby Hermosillo-Solange  Home Phone: [phone]  Relation: Parent  Secondary Emergency Contact: robson palma  Home Phone: [phone]  Relation: Brother/Sister   needed? No    Past Surgical History:  Past Surgical History:   Procedure Laterality Date    NERVE BLOCK  2013    CAUDAL #1- DECADRON 10 MG. NERVE BLOCK  9/10/13    caudal #2 decadron 7.5 mg    NERVE BLOCK  3/28/14    duramorph 1.5mg  celestone 9mg       Immunization History: There is no immunization history on file for this patient.     Active Problems:  Patient Active Problem List   Diagnosis Code    HTN (hypertension) I10    Obesity E66.9    Spinal stenosis, lumbar region, without neurogenic claudication M48.061    Ischemic stroke ([facility] [address] I63.9    Hypertensive emergency I16.1    Hypertensive urgency I16.0    Dysarthria due to recent stroke I69.322    Lacunar stroke ([facility] [address] I63.81       Isolation/Infection:   Isolation            No Isolation          Patient Infection Status       None to display            Nurse Assessment:  Last Vital Signs: /82   Pulse 78   Temp 98.5 °F (36.9 °C) (Oral)   Resp 16   Ht 6' (1.829 m)   Wt (!) 302 lb 1.6 oz (137 kg)   SpO2 100%   BMI 40.97 kg/m²     Last documented pain score (0-10 scale): Pain Level: 5  Last Weight:   Wt Readings from Last 1 Encounters:   22 (!) 302 lb 1.6 oz (137 kg)     Mental Status:  alert and coherent    IV Access:  - Peripheral IV - site  L Antecubital, insertion date: PM EST    PHYSICIAN SECTION    Prognosis: Fair    Condition at Discharge: Stable    Rehab Potential (if transferring to Rehab): Good    Recommended Labs or Other Treatments After Discharge: Monitor blood pressures and follow up with PCP. Physician Certification: I certify the above information and transfer of Wilmar Sullivan  is necessary for the continuing treatment of the diagnosis listed and that he requires [address] for greater 30 days.      Update Admission H&P: No change in H&P    PHYSICIAN SIGNATURE:  Electronically signed by Igor Husain DO on 12/3/22 at 11:31 AM EST

## 2022-12-04 LAB
ESTIMATED AVERAGE GLUCOSE: 120 MG/DL
HBA1C MFR BLD: 5.8 % (ref 4–6)

## 2025-01-20 NOTE — ED PROVIDER NOTES
2025          In regards to Heena Chaves, :  2003    We have been trying to reach you, but the number we have says it is invalid. We have also sent you a message via the [facility] nory. Your prescription for the Wegovy has been approved by your insurance and according to the pharmacy it is ready to be picked up. Please get in contact with them to acquire the prescription. They also have the same phone number we do and have not been able to get in contact with you. Please call or message us with a good contact number for you. You are due for a follow up soon.               From the office of:   Brenna Santos MD   [facility]  [address] [place] [address]  Phone: [phone]  Fax: [phone]   [facility] ED  Emergency Department Encounter  Emergency Medicine Resident     Pt Name: Neftali Gracia  MRN: [id number]  [place] 1965  Date of evaluation: 9/21/22  PCP:  No primary care provider on file. CHIEF COMPLAINT       Chief Complaint   Patient presents with    Hypertension    Suicidal       HISTORY OFPRESENT ILLNESS  (Location/Symptom, Timing/Onset, Context/Setting, Quality, Duration, Modifying Factors,Severity.)      Neftali Gracia is a 62 y.o. male with past medical history of hypertension who presents with suicidal ideation. Patient is very tearful on my exam and is somewhat difficult to obtain a history from. He states that he is suicidal and feels depressed due to several life stressors but will not elaborate on what these stressors are. Denies suicidal plan. Denies homicidal ideation. Is not on any psychiatric medications and has never been psychiatrically admitted before. Repeatedly states \"I cannot breathe\" but when asked if he is short of breath he denies this. He denies fever, chills, rhinorrhea, congestion, chest pain, abdominal pain, nausea, vomiting, diarrhea or urinary symptoms. Denies taking his blood pressure medications today. Patient's sister provides additional history stating that patient has been depressed for a long time and will self isolate. She states he pushes away herself and the remainder of his family including his own children and grandchildren. She does not believe he is done anything to harm himself but knows he feels suicidal.  She denies any formal diagnosis of depression states he is not on any medications. Reports he lives with his girlfriend and does have a good support system. PAST MEDICAL / SURGICAL / SOCIAL / FAMILY HISTORY      has a past medical history of HTN (hypertension) and Obesity. has a past surgical history that includes Nerve Block (08/05/2013); Nerve Block (9/10/13); and Nerve Block (3/28/14).      Social: Family Hx: No family history on file. Allergies:  Patient has no known allergies. Home Medications:  Prior to Admission medications    Medication Sig Start Date End Date Taking? Authorizing Provider   acetaminophen-codeine (TYLENOL/CODEINE #3) 300-30 MG per tablet Take 1 tablet by mouth every 8 hours as needed for Pain. Historical Provider, MD   amLODIPine (NORVASC) 10 MG tablet Take 10 mg by mouth daily. Historical Provider, MD   hydrochlorothiazide (HYDRODIURIL) 25 MG tablet Take 25 mg by mouth daily. Historical Provider, MD   lisinopril (PRINIVIL;ZESTRIL) 40 MG tablet Take 40 mg by mouth daily. Historical Provider, MD   nebivolol (BYSTOLIC) 5 MG tablet Take 5 mg by mouth daily. Historical Provider, MD   traMADol (ULTRAM) 50 MG tablet Take 50 mg by mouth every 8 hours as needed. Historical Provider, MD       REVIEW OFSYSTEMS    (2-9 systems for level 4, 10 or more for level 5)      Review of Systems   Constitutional:  Negative for chills and fever. HENT:  Negative for congestion and rhinorrhea. Respiratory:  Positive for shortness of breath. Negative for cough. Cardiovascular:  Negative for chest pain and leg swelling. Gastrointestinal:  Negative for abdominal pain, diarrhea, nausea and vomiting. Genitourinary:  Negative for decreased urine volume. Musculoskeletal:  Negative for back pain and neck pain. Neurological:  Negative for dizziness, numbness and headaches. Psychiatric/Behavioral:  Positive for suicidal ideas. Negative for self-injury. The patient is nervous/anxious. All other systems reviewed and are negative.     PHYSICAL EXAM   (up to 7 for level 4, 8 or more forlevel 5)      INITIAL VITALS:   Vitals:    09/20/22 1616 09/20/22 1618 09/20/22 1647 09/20/22 1746   BP:  (!) 226/110 (!) 196/100 (!) 204/123   Pulse: 87 83 79 83   Resp: 14 14     Temp:       TempSrc:       SpO2: 94% 96% 94%    Weight:       Height:            Physical Exam  Vitals and nursing note reviewed. Constitutional:       General: He is in acute distress. Appearance: He is not toxic-appearing. Comments: Adult male sitting in stretcher awake and alert and in mild distress secondary to anxiety. He is very tearful and has difficulty answering questions beyond yes or no answers   HENT:      Head: Normocephalic and atraumatic. Nose: Nose normal.      Mouth/Throat:      Mouth: Mucous membranes are moist.      Pharynx: Oropharynx is clear. Eyes:      Conjunctiva/sclera: Conjunctivae normal.      Pupils: Pupils are equal, round, and reactive to light. Cardiovascular:      Rate and Rhythm: Normal rate and regular rhythm. Pulses: Normal pulses. Heart sounds: No murmur heard. No friction rub. No gallop. Pulmonary:      Effort: Pulmonary effort is normal. No respiratory distress. Breath sounds: Normal breath sounds. No wheezing, rhonchi or rales. Abdominal:      General: There is no distension. Palpations: Abdomen is soft. Tenderness: There is no abdominal tenderness. Musculoskeletal:      Cervical back: Neck supple. No rigidity. Right lower leg: No edema. Left lower leg: No edema. Skin:     General: Skin is warm and dry. Capillary Refill: Capillary refill takes less than 2 seconds. Findings: No rash. Neurological:      General: No focal deficit present. Mental Status: He is alert. Psychiatric:         Mood and Affect: Mood is anxious and depressed. Affect is tearful. Thought Content: Thought content includes suicidal ideation. Thought content does not include homicidal ideation. Thought content does not include homicidal plan.        DIFFERENTIAL  DIAGNOSIS     DDX: Suicidal ideation, depression, anxiety, psychiatric disorder, stress reaction, hypertension, medication noncompliance, ACS, MI, pneumonia    Initial MDM/Plan: 62 y.o. male with past medical history of hypertension who presents with suicidal ideation and no plan. Patient is very tearful and will not elaborate on what is making him suicidal outside of just family stressors. On physical exam, patient is sobbing and difficult to get a history from. Vitals are remarkable for significant hypertension with SBP of 240. Cardiopulmonary and neurological exams are benign. Patient repeatedly telling me he cannot breathe but denies shortness of breath. I suspect this is more related to his anxiety, however given his elevated blood pressure, will obtain cardiac work-up. We will also obtain labs for potential psych admission. Will have social work evaluate the patient. He has not taken his home antihypertensives so will treat him with these medications and repeat blood pressure. Patient is not currently having any symptoms of of hypertensive urgency.     DIAGNOSTIC RESULTS / EMERGENCYDEPARTMENT COURSE / MDM     LABS:  Results for orders placed or performed during the hospital encounter of 09/20/22   CBC with Auto Differential   Result Value Ref Range    WBC 6.4 3.5 - 11.3 k/uL    RBC 5.42 4.21 - 5.77 m/uL    Hemoglobin 16.4 13.0 - 17.0 g/dL    Hematocrit 48.6 40.7 - 50.3 %    MCV 89.7 82.6 - 102.9 fL    MCH 30.3 25.2 - 33.5 pg    MCHC 33.7 28.4 - 34.8 g/dL    RDW 13.9 11.8 - 14.4 %    Platelets 139 450 - 127 k/uL    MPV 11.3 8.1 - 13.5 fL    NRBC Automated 0.0 0.0 per 100 WBC    Seg Neutrophils 50 36 - 65 %    Lymphocytes 36 24 - 43 %    Monocytes 9 3 - 12 %    Eosinophils % 4 1 - 4 %    Basophils 1 0 - 2 %    Immature Granulocytes 0 0 %    Segs Absolute 3.24 1.50 - 8.10 k/uL    Absolute Lymph # 2.32 1.10 - 3.70 k/uL    Absolute Mono # 0.59 0.10 - 1.20 k/uL    Absolute Eos # 0.23 0.00 - 0.44 k/uL    Basophils Absolute 0.04 0.00 - 0.20 k/uL    Absolute Immature Granulocyte <0.03 0.00 - 0.30 k/uL   Comprehensive Metabolic Panel   Result Value Ref Range    Glucose 87 70 - 99 mg/dL    BUN 8 6 - 20 mg/dL    Creatinine 0.92 0.70 - 1.20 mg/dL    Calcium 9.2 8.6 - 10.4 mg/dL Sodium 139 135 - 144 mmol/L    Potassium 3.6 (L) 3.7 - 5.3 mmol/L    Chloride 103 98 - 107 mmol/L    CO2 26 20 - 31 mmol/L    Anion Gap 10 9 - 17 mmol/L    Alkaline Phosphatase 112 40 - 129 U/L    ALT 21 5 - 41 U/L    AST 26 <40 U/L    Total Bilirubin 0.6 0.3 - 1.2 mg/dL    Total Protein 7.8 6.4 - 8.3 g/dL    Albumin 4.4 3.5 - 5.2 g/dL    Albumin/Globulin Ratio 1.3 1.0 - 2.5    GFR Non-African American >60 >60 mL/min    GFR African American >60 >60 mL/min    GFR Comment         TOX SCR, BLD, ED   Result Value Ref Range    Acetaminophen Level <5 (L) 10 - 30 ug/mL    Ethanol <10 <10 mg/dL    Ethanol percent <7.835 <6.767 %    Salicylate Lvl <1 (L) 3 - 10 mg/dL    Toxic Tricyclic Sc,Blood NEGATIVE NEGATIVE   Troponin   Result Value Ref Range    Troponin, High Sensitivity 25 (H) 0 - 22 ng/L   Urinalysis with Microscopic   Result Value Ref Range    Color, UA Dark Yellow (A) Yellow    Turbidity UA Clear Clear    Glucose, Ur NEGATIVE NEGATIVE    Bilirubin Urine NEGATIVE  Verified by ictotest. (A) NEGATIVE    Ketones, Urine TRACE (A) NEGATIVE    Specific Gravity, UA 1.027 1.005 - 1.030    Urine Hgb NEGATIVE NEGATIVE    pH, UA 5.5 5.0 - 8.0    Protein, UA 1+ (A) NEGATIVE    Urobilinogen, Urine Normal Normal    Nitrite, Urine NEGATIVE NEGATIVE    Leukocyte Esterase, Urine TRACE (A) NEGATIVE    WBC, UA 5 TO 10 0 - 5 /HPF    RBC, UA 2 TO 5 0 - 4 /HPF    Casts UA  0 - 8 /LPF     10 TO 20 HYALINE Reference range defined for non-centrifuged specimen.     Epithelial Cells UA 0 TO 2 0 - 5 /HPF   Urine Drug Screen   Result Value Ref Range    Amphetamine Screen, Ur NEGATIVE NEGATIVE    Barbiturate Screen, Ur NEGATIVE NEGATIVE    Benzodiazepine Screen, Urine NEGATIVE     Cocaine Metabolite, Urine NEGATIVE NEGATIVE    Methadone Screen, Urine NEGATIVE NEGATIVE    Opiates, Urine NEGATIVE NEGATIVE    Phencyclidine, Urine NEGATIVE NEGATIVE    Cannabinoid Scrn, Ur POSITIVE (A) NEGATIVE    Oxycodone Screen, Ur NEGATIVE NEGATIVE    Fentanyl, Ur myself and do not feel it is consistent with pneumonia. Patient is also afebrile. Blood pressure now ranging in the 190s. Goal is 180s.    1700: Social work has evaluated the patient and he is currently denying any suicidal ideation. He states he was just feeling overwhelmed and like he had no one to talk to. Patient was able to contract for safety and states he would like to call a therapist in the morning. Outpatient resources were provided to the patient, as well as rescue crisis. Patient was strongly encouraged to return to the emergency department for any continued or worsening symptoms including any suicidal thoughts or if he was just unable to get an outpatient appointment in a timely manner. Patient is in agreement with this plan. Also discussed with patient following up with his primary care provider and cardiologist to discuss possible medication changes, given his continued hypertension. FINAL IMPRESSION      1. Depression, unspecified depression type    2.  Hypertension, unspecified type          DISPOSITION / PLAN     DISPOSITION Decision To Discharge 09/20/2022 05:18:32 PM      PATIENT REFERRED TO:  [address] [address] [phone]  Schedule an appointment as soon as possible for a visit       [facility]  [address] [place] [id number]  [phone]  Schedule an appointment as soon as possible for a visit       [facility]  [address] [facility] [address] [phone]    If symptoms worsen      DISCHARGE MEDICATIONS:  Discharge Medication List as of 9/20/2022  5:28 PM          Xiang Granados DO  Emergency Medicine Resident    (Please note that portions of this note were completed with a voice recognition program.Efforts were made to edit the dictations but occasionally words are mis-transcribed.)        Xiang Granados DO  Resident  09/21/22 [id number]